# Patient Record
Sex: FEMALE | Race: WHITE | NOT HISPANIC OR LATINO | Employment: FULL TIME | URBAN - METROPOLITAN AREA
[De-identification: names, ages, dates, MRNs, and addresses within clinical notes are randomized per-mention and may not be internally consistent; named-entity substitution may affect disease eponyms.]

---

## 2018-05-12 PROBLEM — J01.10 ACUTE FRONTAL SINUSITIS: Status: ACTIVE | Noted: 2018-05-12

## 2018-05-12 PROBLEM — B34.9 PHARYNGITIS WITH VIRAL SYNDROME: Status: ACTIVE | Noted: 2018-05-12

## 2018-05-12 PROBLEM — J02.9 PHARYNGITIS WITH VIRAL SYNDROME: Status: ACTIVE | Noted: 2018-05-12

## 2019-05-10 DIAGNOSIS — N64.89 BREAST ASYMMETRY: Primary | ICD-10-CM

## 2019-05-24 ENCOUNTER — APPOINTMENT (OUTPATIENT)
Dept: WOMENS IMAGING | Facility: HOSPITAL | Age: 64
End: 2019-05-24

## 2019-05-24 PROCEDURE — 77065 DX MAMMO INCL CAD UNI: CPT | Performed by: RADIOLOGY

## 2019-05-31 DIAGNOSIS — N64.89 BREAST ASYMMETRY: ICD-10-CM

## 2023-10-16 NOTE — PROGRESS NOTES
"Chief Complaint  Establish Care    Subjective        HPI   Joan presents to [facility] for a new pt visit. Hasn't seen a PCP recently. Retired RN.     Gyn Care: Was seeing Dr. Cleve Jean, hasn't seen in years. Had a hysterectomy for endometriosis in her 30s. No abnormal pap smears. Took hormones for a little while    Has osteoporosis: Broke a rib doing yard work, found out she had osteoporosis. Previously was on medication, quit taking when bone density reportedly returned to normal    Effexor: Has panic attacks and PTSD, several traumas in hre life. Anxiety worse after hysterectomy. Sees Dr. Velazquez for psychiatric care.    Colon cancer screening: Needs. No symptoms such as unintentional wt loss, change in stool habits, blood in stool    FH: Father had pancreatic cancer. Mother had lung cancer (smoker). Also breast cancer in the family.     Tired all the time. Sleeps well but doesn't awaken refreshed. Ex- used say she snores like thier bulldog.     Has occ vertigo, chronic tinnitus and hearing loss in R ear. Saw two ENTs and was told she had a bit of hearing loss on the right. Was told to avoid caffeine, stressors.      Objective   Vital Signs:   Vitals:    10/23/23 1030   BP: 124/88   BP Location: Left arm   Patient Position: Sitting   Cuff Size: Adult   Pulse: 67   SpO2: 96%   Weight: 60.5 kg (133 lb 4.8 oz)   Height: 165.1 cm (65\")                     Physical Exam  Constitutional:       General: She is not in acute distress.     Appearance: Normal appearance. She is not toxic-appearing.   HENT:      Head: Normocephalic and atraumatic.      Right Ear: Tympanic membrane normal.      Left Ear: Tympanic membrane normal.      Mouth/Throat:      Mouth: Mucous membranes are moist.   Eyes:      General: No scleral icterus.     Conjunctiva/sclera: Conjunctivae normal.   Cardiovascular:      Rate and Rhythm: Normal rate and regular rhythm.      Heart sounds: Normal heart sounds. " No murmur heard.     No friction rub. No gallop.   Pulmonary:      Effort: Pulmonary effort is normal. No respiratory distress.      Breath sounds: Normal breath sounds.   Abdominal:      General: Bowel sounds are normal. There is no distension.      Palpations: Abdomen is soft.      Tenderness: There is no abdominal tenderness.   Musculoskeletal:         General: No swelling, tenderness or deformity. Normal range of motion.      Cervical back: Normal range of motion and neck supple.      Right lower leg: No edema.      Left lower leg: No edema.   Skin:     General: Skin is warm and dry.      Findings: No lesion or rash.   Neurological:      General: No focal deficit present.      Mental Status: She is alert and oriented to person, place, and time.   Psychiatric:         Mood and Affect: Mood normal.         Behavior: Behavior normal.         Judgment: Judgment normal.          Result Review :     The following data was reviewed by: Adry Aggarwal MD on 10/23/2023:  UC with Zane Beavers MD (08/28/2020)   UC with Gerardo Marrero MD (12/15/2022)            Assessment and Plan    Diagnoses and all orders for this visit:    1. Osteoporosis, unspecified osteoporosis type, unspecified pathological fracture presence (Primary)  Assessment & Plan:  Hasn't had a DEXA in a long while. Previously was on medication for this (suspect Fosamax), told bones returned to normal. Check DEXA, TSH, Vit D, CMP.    Orders:  -     TSH Rfx On Abnormal To Free T4  -     Vitamin D,25-Hydroxy    2. Routine health maintenance  Assessment & Plan:  Discussed colonoscopy patient asymptomatic, no history of polyps.  Patient prefers to proceed with Cologuard, with the understanding she would need a colonoscopy if it is positive. Ordered    Mammogram ordered  DEXA ordered  Discussed recommended vaccines, patient would like to think about getting them  Labs today  No need for cervical CA screening, s/p hyst in her 30s for endometriosis, no  abnormal paps    Orders:  -     CBC (No Diff)  -     Comprehensive Metabolic Panel  -     Hepatitis C Antibody  -     Lipid Panel  -     TSH Rfx On Abnormal To Free T4    3. Screening for lipid disorders  -     Lipid Panel    4. Encounter for hepatitis C screening test for low risk patient  -     Hepatitis C Antibody    5. Screening for colon cancer  -     Cologuard - Stool, Per Rectum; Future    6. Other osteoporosis without current pathological fracture  Assessment & Plan:  Hasn't had a DEXA in a long while. Previously was on medication for this (suspect Fosamax), told bones returned to normal. Check DEXA, TSH, Vit D, CMP.    Orders:  -     TSH Rfx On Abnormal To Free T4  -     DEXA Bone Density Axial    7. Encounter for screening mammogram for malignant neoplasm of breast  -     Mammo screening digital tomosynthesis bilateral w CAD; Future    8. Other fatigue  Assessment & Plan:  CBC, CMP, TSH today  Awakens un-refreshed, apparently snores, could consider WEST work up if labs unrevealing      9. Tinnitus of right ear  Assessment & Plan:  Also reportedly with mild R hearing loss and intermittent vertigo. Has seen ENT. Told to avoid triggers. Never has been diagnosed with Meniere's disease but will keep in ddx.       10. PTSD (post-traumatic stress disorder)  Assessment & Plan:  Sees a psychiatrist, Dr. Velazquez, who prescribes Effexor.       11. Anxiety      Follow Up   Return in about 1 year (around 10/23/2024) for Medicare Wellness and regular appt-30 minutes.  Patient was given instructions and counseling regarding her condition or for health maintenance advice. Please see specific information pulled into the AVS if appropriate.

## 2023-10-23 ENCOUNTER — OFFICE VISIT (OUTPATIENT)
Dept: FAMILY MEDICINE CLINIC | Facility: CLINIC | Age: 68
End: 2023-10-23
Payer: MEDICARE

## 2023-10-23 VITALS
WEIGHT: 133.3 LBS | BODY MASS INDEX: 22.21 KG/M2 | DIASTOLIC BLOOD PRESSURE: 88 MMHG | HEIGHT: 65 IN | HEART RATE: 67 BPM | OXYGEN SATURATION: 96 % | SYSTOLIC BLOOD PRESSURE: 124 MMHG

## 2023-10-23 DIAGNOSIS — M81.8 OTHER OSTEOPOROSIS WITHOUT CURRENT PATHOLOGICAL FRACTURE: ICD-10-CM

## 2023-10-23 DIAGNOSIS — M81.0 OSTEOPOROSIS, UNSPECIFIED OSTEOPOROSIS TYPE, UNSPECIFIED PATHOLOGICAL FRACTURE PRESENCE: Primary | ICD-10-CM

## 2023-10-23 DIAGNOSIS — Z00.00 ROUTINE HEALTH MAINTENANCE: ICD-10-CM

## 2023-10-23 DIAGNOSIS — Z12.11 SCREENING FOR COLON CANCER: ICD-10-CM

## 2023-10-23 DIAGNOSIS — F41.9 ANXIETY: ICD-10-CM

## 2023-10-23 DIAGNOSIS — F43.10 PTSD (POST-TRAUMATIC STRESS DISORDER): ICD-10-CM

## 2023-10-23 DIAGNOSIS — Z12.31 ENCOUNTER FOR SCREENING MAMMOGRAM FOR MALIGNANT NEOPLASM OF BREAST: ICD-10-CM

## 2023-10-23 DIAGNOSIS — H93.11 TINNITUS OF RIGHT EAR: ICD-10-CM

## 2023-10-23 DIAGNOSIS — Z13.220 SCREENING FOR LIPID DISORDERS: ICD-10-CM

## 2023-10-23 DIAGNOSIS — R53.83 OTHER FATIGUE: ICD-10-CM

## 2023-10-23 DIAGNOSIS — Z11.59 ENCOUNTER FOR HEPATITIS C SCREENING TEST FOR LOW RISK PATIENT: ICD-10-CM

## 2023-10-23 PROBLEM — B34.9 PHARYNGITIS WITH VIRAL SYNDROME: Status: RESOLVED | Noted: 2018-05-12 | Resolved: 2023-10-23

## 2023-10-23 PROBLEM — J01.10 ACUTE FRONTAL SINUSITIS: Status: RESOLVED | Noted: 2018-05-12 | Resolved: 2023-10-23

## 2023-10-23 PROBLEM — J02.9 PHARYNGITIS WITH VIRAL SYNDROME: Status: RESOLVED | Noted: 2018-05-12 | Resolved: 2023-10-23

## 2023-10-23 NOTE — ASSESSMENT & PLAN NOTE
CBC, CMP, TSH today  Awakens un-refreshed, apparently snores, could consider WEST work up if labs unrevealing

## 2023-10-23 NOTE — PATIENT INSTRUCTIONS
Vaccines to consider: [id number], Shingrix, tetanus, influenza, COVID-19  We will give you a call about mammogram and DEXA scan  Cologuard will be mailed to you

## 2023-10-23 NOTE — ASSESSMENT & PLAN NOTE
Discussed colonoscopy patient asymptomatic, no history of polyps.  Patient prefers to proceed with Cologuard, with the understanding she would need a colonoscopy if it is positive. Ordered    Mammogram ordered  DEXA ordered  Discussed recommended vaccines, patient would like to think about getting them  Labs today  No need for cervical CA screening, s/p hyst in her 30s for endometriosis, no abnormal paps

## 2023-10-23 NOTE — ASSESSMENT & PLAN NOTE
Also reportedly with mild R hearing loss and intermittent vertigo. Has seen ENT. Told to avoid triggers. Never has been diagnosed with Meniere's disease but will keep in ddx.

## 2023-10-23 NOTE — ASSESSMENT & PLAN NOTE
Hasn't had a DEXA in a long while. Previously was on medication for this (suspect Fosamax), told bones returned to normal. Check DEXA, TSH, Vit D, CMP.

## 2023-10-24 LAB
25(OH)D3+25(OH)D2 SERPL-MCNC: 22.2 NG/ML (ref 30–100)
ALBUMIN SERPL-MCNC: 4.5 G/DL (ref 3.5–5.2)
ALBUMIN/GLOB SERPL: 2.1 G/DL
ALP SERPL-CCNC: 100 U/L (ref 39–117)
ALT SERPL-CCNC: 21 U/L (ref 1–33)
AST SERPL-CCNC: 21 U/L (ref 1–32)
BILIRUB SERPL-MCNC: 0.3 MG/DL (ref 0–1.2)
BUN SERPL-MCNC: 10 MG/DL (ref 8–23)
BUN/CREAT SERPL: 13.2 (ref 7–25)
CALCIUM SERPL-MCNC: 9.3 MG/DL (ref 8.6–10.5)
CHLORIDE SERPL-SCNC: 104 MMOL/L (ref 98–107)
CHOLEST SERPL-MCNC: 275 MG/DL (ref 0–200)
CO2 SERPL-SCNC: 27.2 MMOL/L (ref 22–29)
CREAT SERPL-MCNC: 0.76 MG/DL (ref 0.57–1)
EGFRCR SERPLBLD CKD-EPI 2021: 86.5 ML/MIN/1.73
ERYTHROCYTE [DISTWIDTH] IN BLOOD BY AUTOMATED COUNT: 12.8 % (ref 12.3–15.4)
GLOBULIN SER CALC-MCNC: 2.1 GM/DL
GLUCOSE SERPL-MCNC: 108 MG/DL (ref 65–99)
HCT VFR BLD AUTO: 46.4 % (ref 34–46.6)
HCV IGG SERPL QL IA: NON REACTIVE
HDLC SERPL-MCNC: 68 MG/DL (ref 40–60)
HGB BLD-MCNC: 15.6 G/DL (ref 12–15.9)
LDLC SERPL CALC-MCNC: 177 MG/DL (ref 0–100)
MCH RBC QN AUTO: 31.1 PG (ref 26.6–33)
MCHC RBC AUTO-ENTMCNC: 33.6 G/DL (ref 31.5–35.7)
MCV RBC AUTO: 92.6 FL (ref 79–97)
PLATELET # BLD AUTO: 243 10*3/MM3 (ref 140–450)
POTASSIUM SERPL-SCNC: 4.2 MMOL/L (ref 3.5–5.2)
PROT SERPL-MCNC: 6.6 G/DL (ref 6–8.5)
RBC # BLD AUTO: 5.01 10*6/MM3 (ref 3.77–5.28)
SODIUM SERPL-SCNC: 140 MMOL/L (ref 136–145)
TRIGL SERPL-MCNC: 164 MG/DL (ref 0–150)
TSH SERPL DL<=0.005 MIU/L-ACNC: 2.21 UIU/ML (ref 0.27–4.2)
VLDLC SERPL CALC-MCNC: 30 MG/DL (ref 5–40)
WBC # BLD AUTO: 7.09 10*3/MM3 (ref 3.4–10.8)

## 2023-10-24 RX ORDER — ERGOCALCIFEROL 1.25 MG/1
50000 CAPSULE ORAL WEEKLY
Qty: 12 CAPSULE | Refills: 0 | Status: SHIPPED | OUTPATIENT
Start: 2023-10-24

## 2023-10-25 ENCOUNTER — PATIENT ROUNDING (BHMG ONLY) (OUTPATIENT)
Dept: FAMILY MEDICINE CLINIC | Facility: CLINIC | Age: 68
End: 2023-10-25
Payer: MEDICARE

## 2023-10-25 NOTE — PROGRESS NOTES
A My-Chart message has been sent to the patient for PATIENT ROUNDING with [facility]

## 2023-11-06 ENCOUNTER — APPOINTMENT (OUTPATIENT)
Dept: OTHER | Facility: HOSPITAL | Age: 68
End: 2023-11-06
Payer: MEDICARE

## 2023-11-06 ENCOUNTER — HOSPITAL ENCOUNTER (OUTPATIENT)
Dept: MAMMOGRAPHY | Facility: HOSPITAL | Age: 68
Discharge: HOME | End: 2023-11-06
Payer: MEDICARE

## 2023-11-06 ENCOUNTER — HOSPITAL ENCOUNTER (OUTPATIENT)
Dept: BONE DENSITY | Facility: HOSPITAL | Age: 68
Discharge: HOME | End: 2023-11-06
Admitting: INTERNAL MEDICINE
Payer: MEDICARE

## 2023-11-06 DIAGNOSIS — Z12.31 ENCOUNTER FOR SCREENING MAMMOGRAM FOR MALIGNANT NEOPLASM OF BREAST: ICD-10-CM

## 2023-11-06 DIAGNOSIS — R92.8 ABNORMAL MAMMOGRAM: Primary | ICD-10-CM

## 2023-11-06 PROCEDURE — 77063 BREAST TOMOSYNTHESIS BI: CPT

## 2023-11-06 PROCEDURE — 77067 SCR MAMMO BI INCL CAD: CPT

## 2023-11-06 PROCEDURE — 77080 DXA BONE DENSITY AXIAL: CPT

## 2023-11-14 ENCOUNTER — OFFICE VISIT (OUTPATIENT)
Dept: FAMILY MEDICINE CLINIC | Facility: CLINIC | Age: 68
End: 2023-11-14
Payer: MEDICARE

## 2023-11-14 VITALS
OXYGEN SATURATION: 98 % | SYSTOLIC BLOOD PRESSURE: 112 MMHG | HEIGHT: 65 IN | WEIGHT: 133 LBS | HEART RATE: 81 BPM | BODY MASS INDEX: 22.16 KG/M2 | DIASTOLIC BLOOD PRESSURE: 76 MMHG

## 2023-11-14 DIAGNOSIS — R73.03 PREDIABETES: ICD-10-CM

## 2023-11-14 DIAGNOSIS — E78.5 HYPERLIPIDEMIA, UNSPECIFIED HYPERLIPIDEMIA TYPE: ICD-10-CM

## 2023-11-14 DIAGNOSIS — E55.9 VITAMIN D INSUFFICIENCY: ICD-10-CM

## 2023-11-14 DIAGNOSIS — M81.6 LOCALIZED OSTEOPOROSIS WITHOUT CURRENT PATHOLOGICAL FRACTURE: Primary | ICD-10-CM

## 2023-11-14 PROBLEM — R92.8 ABNORMAL MAMMOGRAM: Status: ACTIVE | Noted: 2023-11-14

## 2023-11-14 PROCEDURE — 1159F MED LIST DOCD IN RCRD: CPT | Performed by: INTERNAL MEDICINE

## 2023-11-14 PROCEDURE — 1160F RVW MEDS BY RX/DR IN RCRD: CPT | Performed by: INTERNAL MEDICINE

## 2023-11-14 PROCEDURE — 99214 OFFICE O/P EST MOD 30 MIN: CPT | Performed by: INTERNAL MEDICINE

## 2023-11-14 RX ORDER — ALENDRONATE SODIUM 70 MG/1
70 TABLET ORAL
Qty: 12 TABLET | Refills: 3 | Status: SHIPPED | OUTPATIENT
Start: 2023-11-14

## 2023-11-14 NOTE — ASSESSMENT & PLAN NOTE
ASDVD risk 5.7%  Pt working on making positive dietary changes and exercise  Declines nutritionist referral  Re-check in 2-3 mos

## 2023-11-14 NOTE — PROGRESS NOTES
"Chief Complaint  Follow-up and Osteoporosis    Subjective        HPI   Joan presents to [facility] for a follow up visit.    Answers submitted by the patient for this visit:  Other (Submitted on 11/13/2023)  Please describe your symptoms.: F/U visit to discuss medications for osteoporosis  Have you had these symptoms before?: No  How long have you been having these symptoms?: Greater than 2 weeks  Please list any medications you are currently taking for this condition.: Effexor, Vitamin D  Primary Reason for Visit (Submitted on 11/13/2023)  What is the primary reason for your visit?: Other    On labs, Vit D low, cholesterol high, FBS high.    Mammogram abnormal, diagnostic imaging ordered. Has dx imaging scheduled.     DEXA with osteoporosis of both hips and spine. Hx hyst in 30s for endometriosis.    Working on dietary choices and exercises with the HLD and pre-DM.        Objective   Vital Signs:   Vitals:    11/14/23 1504   BP: 112/76   BP Location: Left arm   Patient Position: Sitting   Cuff Size: Adult   Pulse: 81   SpO2: 98%   Weight: 60.3 kg (133 lb)   Height: 165.1 cm (65\")            10/23/2023    10:31 AM   PHQ-2/PHQ-9 Depression Screening   Little Interest or Pleasure in Doing Things 0-->not at all   Feeling Down, Depressed or Hopeless 0-->not at all   PHQ-9: Brief Depression Severity Measure Score 0       BMI is within normal parameters. No other follow-up for BMI required.        Physical Exam  Constitutional:       General: She is not in acute distress.     Appearance: Normal appearance.   HENT:      Head: Normocephalic and atraumatic.   Eyes:      Conjunctiva/sclera: Conjunctivae normal.   Cardiovascular:      Rate and Rhythm: Normal rate.   Pulmonary:      Effort: Pulmonary effort is normal.   Musculoskeletal:         General: No swelling or deformity.   Skin:     Coloration: Skin is not jaundiced.      Findings: No rash.   Neurological:      General: No focal deficit " present.      Mental Status: She is alert and oriented to person, place, and time.   Psychiatric:         Mood and Affect: Mood normal.         Behavior: Behavior normal.         Judgment: Judgment normal.          Result Review :                Assessment and Plan    Diagnoses and all orders for this visit:    1. Localized osteoporosis without current pathological fracture (Primary)  Assessment & Plan:  DEXA Bone Density Axial (11/06/2023 07:33)   T scores -3.2  Recommended Fosamax  Discussed rare risk of atypical femur fractures and ONJ. These risks are MUCH less than risk of osteoporotic fx, especially since pt has had an osteoporotic fx. No major dental work coming up. Discussed proper admin. Discussed adding WB exercising, already eating Ca++ rich foods, repleting Vit D      2. Vitamin D insufficiency  Assessment & Plan:  Repleting, check Vit D level w next set of labs      3. Hyperlipidemia, unspecified hyperlipidemia type  Assessment & Plan:  ASDVD risk 5.7%  Pt working on making positive dietary changes and exercise  Declines nutritionist referral  Re-check in 2-3 mos      4. Prediabetes  Assessment & Plan:  See HLD section (same plan)      Other orders  -     alendronate (Fosamax) 70 MG tablet; Take 1 tablet by mouth Every 7 (Seven) Days. Take with full glass of water, 30 minutes before first food or drink of the day (other than water), stay upright x 30 minutes  Dispense: 12 tablet; Refill: 3        Follow Up   Return in about 2 months (around 1/14/2024) for Recheck, Next scheduled follow up, Medicare Wellness and regular appt-30 minutes.  Patient was given instructions and counseling regarding her condition or for health maintenance advice. Please see specific information pulled into the AVS if appropriate.

## 2023-11-14 NOTE — ASSESSMENT & PLAN NOTE
DEXA Bone Density Axial (11/06/2023 07:33)   T scores -3.2  Recommended Fosamax  Discussed rare risk of atypical femur fractures and ONJ. These risks are MUCH less than risk of osteoporotic fx, especially since pt has had an osteoporotic fx. No major dental work coming up. Discussed proper admin. Discussed adding WB exercising, already eating Ca++ rich foods, repleting Vit D

## 2023-11-29 ENCOUNTER — HOSPITAL ENCOUNTER (OUTPATIENT)
Dept: ULTRASOUND IMAGING | Facility: HOSPITAL | Age: 68
Discharge: HOME | End: 2023-11-29
Admitting: INTERNAL MEDICINE
Payer: MEDICARE

## 2023-11-29 ENCOUNTER — HOSPITAL ENCOUNTER (OUTPATIENT)
Dept: MAMMOGRAPHY | Facility: HOSPITAL | Age: 68
Discharge: HOME | End: 2023-11-29
Payer: MEDICARE

## 2023-11-29 DIAGNOSIS — R92.8 ABNORMAL MAMMOGRAM: ICD-10-CM

## 2023-11-29 PROCEDURE — 76642 ULTRASOUND BREAST LIMITED: CPT

## 2023-11-29 PROCEDURE — 77065 DX MAMMO INCL CAD UNI: CPT

## 2023-11-29 PROCEDURE — G0279 TOMOSYNTHESIS, MAMMO: HCPCS

## 2023-12-18 ENCOUNTER — OFFICE VISIT (OUTPATIENT)
Dept: FAMILY MEDICINE CLINIC | Facility: CLINIC | Age: 68
End: 2023-12-18
Payer: MEDICARE

## 2023-12-18 ENCOUNTER — HOSPITAL ENCOUNTER (OUTPATIENT)
Dept: CT IMAGING | Facility: HOSPITAL | Age: 68
Discharge: HOME | End: 2023-12-18
Admitting: INTERNAL MEDICINE
Payer: MEDICARE

## 2023-12-18 VITALS
DIASTOLIC BLOOD PRESSURE: 82 MMHG | OXYGEN SATURATION: 97 % | HEART RATE: 87 BPM | HEIGHT: 65 IN | BODY MASS INDEX: 22.16 KG/M2 | SYSTOLIC BLOOD PRESSURE: 128 MMHG | WEIGHT: 133 LBS

## 2023-12-18 DIAGNOSIS — R55 SYNCOPE, UNSPECIFIED SYNCOPE TYPE: Primary | ICD-10-CM

## 2023-12-18 DIAGNOSIS — R06.09 DYSPNEA ON EXERTION: ICD-10-CM

## 2023-12-18 DIAGNOSIS — R06.2 WHEEZING: ICD-10-CM

## 2023-12-18 DIAGNOSIS — R05.1 ACUTE COUGH: ICD-10-CM

## 2023-12-18 DIAGNOSIS — M81.6 LOCALIZED OSTEOPOROSIS WITHOUT CURRENT PATHOLOGICAL FRACTURE: ICD-10-CM

## 2023-12-18 PROCEDURE — 82565 ASSAY OF CREATININE: CPT

## 2023-12-18 PROCEDURE — 25510000001 IOPAMIDOL PER 1 ML: Performed by: INTERNAL MEDICINE

## 2023-12-18 PROCEDURE — 71275 CT ANGIOGRAPHY CHEST: CPT

## 2023-12-18 RX ORDER — PREDNISONE 20 MG/1
40 TABLET ORAL DAILY
Qty: 10 TABLET | Refills: 0 | Status: SHIPPED | OUTPATIENT
Start: 2023-12-18 | End: 2023-12-23

## 2023-12-18 RX ORDER — ALBUTEROL SULFATE 90 UG/1
2 AEROSOL, METERED RESPIRATORY (INHALATION) EVERY 4 HOURS PRN
Qty: 6.7 G | Refills: 0 | Status: SHIPPED | OUTPATIENT
Start: 2023-12-18

## 2023-12-18 RX ORDER — BENZONATATE 100 MG/1
100 CAPSULE ORAL 3 TIMES DAILY PRN
Qty: 21 CAPSULE | Refills: 0 | Status: SHIPPED | OUTPATIENT
Start: 2023-12-18

## 2023-12-18 RX ADMIN — IOPAMIDOL 95 ML: 755 INJECTION, SOLUTION INTRAVENOUS at 15:23

## 2023-12-18 NOTE — NURSING NOTE
Reported to Dr. Aggarwal CT report. Patient may go home but she will call patient to discuss results.     1618 Instructed pt she may go home and md will call her.     1622 Patient ambulated independently to entrance A.

## 2023-12-18 NOTE — PROGRESS NOTES
"Chief Complaint  Medication Reaction (Started fosamax and started coughing ans wheezing. Saturday went to a [employer] party and got really hot and passed out. )    Subjective        HPI   Joan presents to [facility] for an acute visit. She had a syncopal episode this weekend. Concerned about side effects from Fosamax.     This weekend, she was at a [employer] party, drinking wine, started feeling hot, started sweating, went to bedroom, found on floor. Thought it was just because she got hot. Subsequently recovered. Did not go to ED. Drinks wine most nights so this was not unusual.  She has been coughing and perhaps wheezing a bit, having frothy white and yellow sputum, intermittently short of breath for the last week or so. Can't lie flat, exacerbates sxs. No fevers, chill. Coughing so much her chest is sore. Does not smoke, no known pulmonary disease.     Heartburn and constipation, cough since starting Fosamax. Does not feel that she can take it due to side effects.      Objective   Vital Signs:  Vitals:    12/18/23 1244   BP: 128/82   BP Location: Left arm   Patient Position: Sitting   Cuff Size: Adult   Pulse: 87   SpO2: 97%   Weight: 60.3 kg (133 lb)   Height: 165.1 cm (65\")          Physical Exam  Constitutional:       General: She is not in acute distress.     Appearance: Normal appearance. She is not toxic-appearing.   HENT:      Head: Normocephalic and atraumatic.      Mouth/Throat:      Mouth: Mucous membranes are moist.   Eyes:      General: No scleral icterus.     Conjunctiva/sclera: Conjunctivae normal.   Cardiovascular:      Rate and Rhythm: Normal rate and regular rhythm.      Heart sounds: Normal heart sounds. No murmur heard.     No friction rub. No gallop.   Pulmonary:      Effort: Pulmonary effort is normal. No respiratory distress.      Breath sounds: Normal breath sounds.      Comments: Faint end exp wheezing apices  Musculoskeletal:         General: No swelling, " tenderness or deformity. Normal range of motion.      Cervical back: Normal range of motion and neck supple.      Right lower leg: No edema.      Left lower leg: No edema.   Skin:     General: Skin is warm and dry.      Coloration: Skin is not jaundiced.      Findings: No lesion or rash.   Neurological:      General: No focal deficit present.      Mental Status: She is alert and oriented to person, place, and time.   Psychiatric:         Mood and Affect: Mood normal.         Behavior: Behavior normal.         Judgment: Judgment normal.          Result Review :                Assessment and Plan    Diagnoses and all orders for this visit:    1. Syncope, unspecified syncope type (Primary)  -     Cancel: CBC (No Diff)  -     Cancel: Comprehensive Metabolic Panel  -     Cancel: proBNP  -     ECG 12 Lead  -     CBC (No Diff)  -     Comprehensive Metabolic Panel  -     proBNP  -     CT Angiogram Chest  -     Adult Transthoracic Echo Complete W/ Cont if Necessary Per Protocol; Future  -     Ambulatory Referral to Cardiology    2. Localized osteoporosis without current pathological fracture    3. Wheezing  -     Cancel: CBC (No Diff)  -     CBC (No Diff)  -     POCT SARS-CoV-2 Antigen DEXTER + Flu  -     predniSONE (DELTASONE) 20 MG tablet; Take 2 tablets by mouth Daily for 5 days.  Dispense: 10 tablet; Refill: 0  -     albuterol sulfate  (90 Base) MCG/ACT inhaler; Inhale 2 puffs Every 4 (Four) Hours As Needed for Wheezing or Shortness of Air.  Dispense: 6.7 g; Refill: 0  -     benzonatate (Tessalon Perles) 100 MG capsule; Take 1 capsule by mouth 3 (Three) Times a Day As Needed for Cough.  Dispense: 21 capsule; Refill: 0    4. Acute cough  -     Cancel: CBC (No Diff)  -     Cancel: Comprehensive Metabolic Panel  -     Cancel: proBNP  -     CBC (No Diff)  -     Comprehensive Metabolic Panel  -     proBNP  -     POCT SARS-CoV-2 Antigen DEXTER + Flu  -     CT Angiogram Chest  -     predniSONE (DELTASONE) 20 MG tablet; Take 2  tablets by mouth Daily for 5 days.  Dispense: 10 tablet; Refill: 0  -     albuterol sulfate  (90 Base) MCG/ACT inhaler; Inhale 2 puffs Every 4 (Four) Hours As Needed for Wheezing or Shortness of Air.  Dispense: 6.7 g; Refill: 0  -     benzonatate (Tessalon Perles) 100 MG capsule; Take 1 capsule by mouth 3 (Three) Times a Day As Needed for Cough.  Dispense: 21 capsule; Refill: 0    5. Dyspnea on exertion  -     Cancel: proBNP  -     ECG 12 Lead  -     proBNP  -     CT Angiogram Chest    EKG personally interpreted by me, no prior comparison. Shows NSR, no WV or Qtc prolongation, LAD, very minimal ST depression V2/V3.Due to syncopal episode this weekend, report of cough with dyspnea intermittently, important to eval for PE. CT PE protocol ordered, results called to me, pt not available at time I was called with results. I called pt and LVM, sent her a [email or web address] message. No PE, no obvious consolidation, just mucus plugging. COVID and flu negative.     Plan:   CBC, CMP, BNP today  Steroids and albuterol inhaler due to wheezing, can also take mucinex.   If no improvement in sxs, consider abx but due to illness duration and lack of consolidation on today's imaging, can hold off for now.   Echocardiogram  Cardiology consult. Syncope likely vasovagal but unusual circumstances of syncope.   Follow up in about a month to discuss alternate osteoporosis therapies, appears that she is unable to tolerate Fosamax due to GI side effects. She self-discontinued.     I spent 45 minutes caring for Joan on this date of service. This time includes time spent by me in the following activities:preparing for the visit, reviewing tests, obtaining and/or reviewing a separately obtained history, performing a medically appropriate examination and/or evaluation , counseling and educating the patient/family/caregiver, ordering medications, tests, or procedures, documenting information in the medical record, independently interpreting results  and communicating that information with the patient/family/caregiver, and care coordination    Follow Up   Return in about 4 weeks (around 1/15/2024), or if symptoms worsen or fail to improve, for Recheck, Next scheduled follow up.  Patient was given instructions and counseling regarding her condition or for health maintenance advice. Please see specific information pulled into the AVS if appropriate.

## 2023-12-19 LAB
ALBUMIN SERPL-MCNC: 4.2 G/DL (ref 3.5–5.2)
ALBUMIN/GLOB SERPL: 2.1 G/DL
ALP SERPL-CCNC: 92 U/L (ref 39–117)
ALT SERPL-CCNC: 19 U/L (ref 1–33)
AST SERPL-CCNC: 17 U/L (ref 1–32)
BILIRUB SERPL-MCNC: <0.2 MG/DL (ref 0–1.2)
BUN SERPL-MCNC: 9 MG/DL (ref 8–23)
BUN/CREAT SERPL: 12.3 (ref 7–25)
CALCIUM SERPL-MCNC: 8.9 MG/DL (ref 8.6–10.5)
CHLORIDE SERPL-SCNC: 103 MMOL/L (ref 98–107)
CO2 SERPL-SCNC: 25.2 MMOL/L (ref 22–29)
CREAT BLDA-MCNC: 0.6 MG/DL (ref 0.6–1.3)
CREAT SERPL-MCNC: 0.73 MG/DL (ref 0.57–1)
EGFRCR SERPLBLD CKD-EPI 2021: 90.3 ML/MIN/1.73
ERYTHROCYTE [DISTWIDTH] IN BLOOD BY AUTOMATED COUNT: 12.6 % (ref 12.3–15.4)
EXPIRATION DATE: NORMAL
FLUAV AG UPPER RESP QL IA.RAPID: NOT DETECTED
FLUBV AG UPPER RESP QL IA.RAPID: NOT DETECTED
GLOBULIN SER CALC-MCNC: 2 GM/DL
GLUCOSE SERPL-MCNC: 97 MG/DL (ref 65–99)
HCT VFR BLD AUTO: 41.5 % (ref 34–46.6)
HGB BLD-MCNC: 14.3 G/DL (ref 12–15.9)
INTERNAL CONTROL: NORMAL
Lab: NORMAL
MCH RBC QN AUTO: 31.6 PG (ref 26.6–33)
MCHC RBC AUTO-ENTMCNC: 34.5 G/DL (ref 31.5–35.7)
MCV RBC AUTO: 91.6 FL (ref 79–97)
NT-PROBNP SERPL-MCNC: 62 PG/ML (ref 0–301)
PLATELET # BLD AUTO: 242 10*3/MM3 (ref 140–450)
POTASSIUM SERPL-SCNC: 4.1 MMOL/L (ref 3.5–5.2)
PROT SERPL-MCNC: 6.2 G/DL (ref 6–8.5)
RBC # BLD AUTO: 4.53 10*6/MM3 (ref 3.77–5.28)
SARS-COV-2 AG UPPER RESP QL IA.RAPID: NOT DETECTED
SODIUM SERPL-SCNC: 139 MMOL/L (ref 136–145)
WBC # BLD AUTO: 6.96 10*3/MM3 (ref 3.4–10.8)

## 2023-12-21 ENCOUNTER — OFFICE VISIT (OUTPATIENT)
Dept: CARDIOLOGY | Facility: CLINIC | Age: 68
End: 2023-12-21
Payer: MEDICARE

## 2023-12-21 VITALS
BODY MASS INDEX: 22.16 KG/M2 | RESPIRATION RATE: 16 BRPM | WEIGHT: 133 LBS | SYSTOLIC BLOOD PRESSURE: 120 MMHG | OXYGEN SATURATION: 99 % | HEART RATE: 63 BPM | HEIGHT: 65 IN | DIASTOLIC BLOOD PRESSURE: 76 MMHG

## 2023-12-21 DIAGNOSIS — R55 SYNCOPE AND COLLAPSE: Primary | ICD-10-CM

## 2023-12-21 DIAGNOSIS — R06.01 ORTHOPNEA: ICD-10-CM

## 2023-12-21 PROCEDURE — 93000 ELECTROCARDIOGRAM COMPLETE: CPT | Performed by: INTERNAL MEDICINE

## 2023-12-21 PROCEDURE — 99204 OFFICE O/P NEW MOD 45 MIN: CPT | Performed by: INTERNAL MEDICINE

## 2023-12-21 NOTE — PROGRESS NOTES
"      CARDIOLOGY    Radha Dickinson MD    ENCOUNTER DATE:  12/21/2023    Joan Gonzales / 68 y.o. / female        CHIEF COMPLAINT / REASON FOR OFFICE VISIT     Heart Problem (Syncope/)      HISTORY OF PRESENT ILLNESS       HPI    Joan Gonzales is a 68 y.o. 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 She was feeling well until she started taking Fosamax a few weeks ago and she developed some shortness of breath and coughing with some clear frothy sputum coming up. She had some wheezing and orthopnea. Then she was at a [employer] party less than a week ago and felt really hot and went to go get her purse and had a syncopal episode where she was only unconscious for a few seconds. She had a CT scan which was negative for PE but did show some mucus plugging. I reviewed those images myself and did not see any coronary artery calcification, though I am a little concerned that she may have some left ventricular hypertrophy.         REVIEW OF SYSTEMS     Review of Systems   Constitutional: Negative for chills, fever, weight gain and weight loss.   Cardiovascular:  Negative for leg swelling.   Respiratory:  Positive for cough and wheezing. Negative for snoring.    Hematologic/Lymphatic: Negative for bleeding problem. Does not bruise/bleed easily.   Skin:  Negative for color change.   Musculoskeletal:  Negative for falls, joint pain and myalgias.   Gastrointestinal:  Negative for melena.   Genitourinary:  Negative for hematuria.   Neurological:  Negative for excessive daytime sleepiness.   Psychiatric/Behavioral:  Negative for depression. The patient is nervous/anxious.          VITAL SIGNS     Visit Vitals  /76 (BP Location: Right arm, Patient Position: Sitting, Cuff Size: Adult)   Pulse 63   Resp 16   Ht 165.1 cm (65\")   Wt 60.3 kg (133 lb)   SpO2 99%   BMI 22.13 kg/m²         Wt Readings from Last 3 Encounters:   12/21/23 60.3 kg (133 lb)   12/18/23 60.3 kg (133 lb)   11/14/23 60.3 kg (133 lb)     Body " mass index is 22.13 kg/m².      PHYSICAL EXAMINATION     Constitutional:       General: Not in acute distress.  Neck:      Vascular: No carotid bruit or JVD.   Pulmonary:      Effort: Pulmonary effort is normal.      Breath sounds: Normal breath sounds.   Cardiovascular:      Normal rate. Regular rhythm.      Murmurs: There is no murmur.   Psychiatric:         Mood and Affect: Mood and affect normal.           REVIEWED DATA       ECG 12 Lead    Date/Time: 12/21/2023 11:26 AM  Performed by: Radha Dickinson MD    Authorized by: Radha Dickinson MD  Comparison: compared with previous ECG from 12/18/2023  Similar to previous ECG  Rhythm: sinus rhythm  BPM: 63  Conduction: conduction normal  ST Segments: ST segments normal  T Waves: T waves normal    Clinical impression: normal ECG            Lipid Panel          10/23/2023    11:21   Lipid Panel   Total Cholesterol 275    Triglycerides 164    HDL Cholesterol 68    VLDL Cholesterol 30    LDL Cholesterol  177        Lab Results   Component Value Date    GLUCOSE 97 12/18/2023    BUN 9 12/18/2023    CREATININE 0.60 12/18/2023    EGFRRESULT 90.3 12/18/2023    BCR 12.3 12/18/2023    K 4.1 12/18/2023    CO2 25.2 12/18/2023    CALCIUM 8.9 12/18/2023    PROTENTOTREF 6.2 12/18/2023    ALBUMIN 4.2 12/18/2023    BILITOT <0.2 12/18/2023    AST 17 12/18/2023    ALT 19 12/18/2023       ASSESSMENT & PLAN      Diagnosis Plan   1. Syncope and collapse  Adult Transthoracic Echo Complete W/ Cont if Necessary Per Protocol      2. Orthopnea            Syncope.  Probably, this is vagal considering that she was overheated.  Shortness of breath with coughing with clear, frothy sputum, wheezing and orthopnea.  Her BNP was normal and her chest x-ray did not show any fluid overload.  I would like to check an echocardiogram to assess her LV function and look for any left ventricular hypertrophy or anything that might be causing her symptoms.  Her EKG is normal.    Osteoporosis.  All of her  symptoms started with Fosamax.  I am not familiar with that causing these symptoms but in her case it does seem to be correlated so she is going to talk with Dr. Aggarwal about a different medication.     One of my nurses or I will go over the results of her echo when it becomes available.          Orders Placed This Encounter   Procedures    ECG 12 Lead     This order was created via procedure documentation     Order Specific Question:   Release to patient     Answer:   Routine Release [[id number]]    Adult Transthoracic Echo Complete W/ Cont if Necessary Per Protocol     Standing Status:   Future     Standing Expiration Date:   12/20/2024     Order Specific Question:   Reason for exam?     Answer:   Dyspnea     Order Specific Question:   Reason for exam?     Answer:   Syncope     Order Specific Question:   Release to patient     Answer:   Routine Release [[id number]]           MEDICATIONS         Discharge Medications            Accurate as of December 21, 2023 11:27 AM. If you have any questions, ask your nurse or doctor.                Changes to Medications        Instructions Start Date   albuterol sulfate  (90 Base) MCG/ACT inhaler  Commonly known as: PROVENTIL HFA;VENTOLIN HFA;PROAIR HFA  What changed: additional instructions   2 puffs, Inhalation, Every 4 Hours PRN             Continue These Medications        Instructions Start Date   benzonatate 100 MG capsule  Commonly known as: Tessalon Perles   100 mg, Oral, 3 Times Daily PRN      predniSONE 20 MG tablet  Commonly known as: DELTASONE   40 mg, Oral, Daily      venlafaxine 75 MG tablet  Commonly known as: EFFEXOR   75 mg, Oral, Daily      vitamin D 1.25 MG (83462 UT) capsule capsule  Commonly known as: ERGOCALCIFEROL   50,000 Units, Oral, Weekly                 Radha Dickinson MD  12/21/23  11:27 EST    Part of this note may be an electronic transcription/translation of spoken language to printed text using the Dragon dictation system.

## 2024-01-02 ENCOUNTER — HOSPITAL ENCOUNTER (OUTPATIENT)
Dept: CARDIOLOGY | Facility: HOSPITAL | Age: 69
Discharge: HOME | End: 2024-01-02
Admitting: INTERNAL MEDICINE
Payer: MEDICARE

## 2024-01-02 VITALS
SYSTOLIC BLOOD PRESSURE: 130 MMHG | HEART RATE: 72 BPM | BODY MASS INDEX: 22.16 KG/M2 | HEIGHT: 65 IN | WEIGHT: 133 LBS | DIASTOLIC BLOOD PRESSURE: 70 MMHG | OXYGEN SATURATION: 100 %

## 2024-01-02 DIAGNOSIS — R55 SYNCOPE AND COLLAPSE: ICD-10-CM

## 2024-01-02 LAB
AORTIC ARCH: 2.9 CM
AORTIC DIMENSIONLESS INDEX: 0.7 (DI)
ASCENDING AORTA: 3 CM
BH CV ECHO MEAS - ACS: 1.68 CM
BH CV ECHO MEAS - AO MAX PG: 7.7 MMHG
BH CV ECHO MEAS - AO MEAN PG: 4 MMHG
BH CV ECHO MEAS - AO ROOT DIAM: 3.3 CM
BH CV ECHO MEAS - AO V2 MAX: 139 CM/SEC
BH CV ECHO MEAS - AO V2 VTI: 32.8 CM
BH CV ECHO MEAS - AVA(I,D): 1.89 CM2
BH CV ECHO MEAS - EDV(CUBED): 67.8 ML
BH CV ECHO MEAS - EDV(MOD-SP2): 68 ML
BH CV ECHO MEAS - EDV(MOD-SP4): 95 ML
BH CV ECHO MEAS - EF(MOD-BP): 63.7 %
BH CV ECHO MEAS - EF(MOD-SP2): 61.8 %
BH CV ECHO MEAS - EF(MOD-SP4): 63.2 %
BH CV ECHO MEAS - ESV(CUBED): 17 ML
BH CV ECHO MEAS - ESV(MOD-SP2): 26 ML
BH CV ECHO MEAS - ESV(MOD-SP4): 35 ML
BH CV ECHO MEAS - FS: 36.9 %
BH CV ECHO MEAS - IVS/LVPW: 1.05 CM
BH CV ECHO MEAS - IVSD: 1.07 CM
BH CV ECHO MEAS - LAT PEAK E' VEL: 9.6 CM/SEC
BH CV ECHO MEAS - LV DIASTOLIC VOL/BSA (35-75): 57.1 CM2
BH CV ECHO MEAS - LV MASS(C)D: 138.9 GRAMS
BH CV ECHO MEAS - LV MAX PG: 3.8 MMHG
BH CV ECHO MEAS - LV MEAN PG: 1.8 MMHG
BH CV ECHO MEAS - LV SYSTOLIC VOL/BSA (12-30): 21 CM2
BH CV ECHO MEAS - LV V1 MAX: 97.4 CM/SEC
BH CV ECHO MEAS - LV V1 VTI: 21.1 CM
BH CV ECHO MEAS - LVIDD: 4.1 CM
BH CV ECHO MEAS - LVIDS: 2.6 CM
BH CV ECHO MEAS - LVOT AREA: 2.9 CM2
BH CV ECHO MEAS - LVOT DIAM: 1.93 CM
BH CV ECHO MEAS - LVPWD: 1.02 CM
BH CV ECHO MEAS - MED PEAK E' VEL: 6.5 CM/SEC
BH CV ECHO MEAS - MR MAX PG: 83.2 MMHG
BH CV ECHO MEAS - MR MAX VEL: 456.1 CM/SEC
BH CV ECHO MEAS - MV A DUR: 0.11 SEC
BH CV ECHO MEAS - MV A MAX VEL: 67.3 CM/SEC
BH CV ECHO MEAS - MV DEC SLOPE: 327.1 CM/SEC2
BH CV ECHO MEAS - MV DEC TIME: 0.17 SEC
BH CV ECHO MEAS - MV E MAX VEL: 67.7 CM/SEC
BH CV ECHO MEAS - MV E/A: 1.01
BH CV ECHO MEAS - MV MAX PG: 2.8 MMHG
BH CV ECHO MEAS - MV MEAN PG: 1.25 MMHG
BH CV ECHO MEAS - MV P1/2T: 71.7 MSEC
BH CV ECHO MEAS - MV V2 VTI: 28 CM
BH CV ECHO MEAS - MVA(P1/2T): 3.1 CM2
BH CV ECHO MEAS - MVA(VTI): 2.21 CM2
BH CV ECHO MEAS - PA ACC TIME: 0.13 SEC
BH CV ECHO MEAS - PA V2 MAX: 84.2 CM/SEC
BH CV ECHO MEAS - PI END-D VEL: 79.4 CM/SEC
BH CV ECHO MEAS - PULM A REVS DUR: 0.13 SEC
BH CV ECHO MEAS - PULM A REVS VEL: 30.4 CM/SEC
BH CV ECHO MEAS - PULM DIAS VEL: 45 CM/SEC
BH CV ECHO MEAS - PULM S/D: 1.36
BH CV ECHO MEAS - PULM SYS VEL: 61.3 CM/SEC
BH CV ECHO MEAS - QP/QS: 0.39
BH CV ECHO MEAS - RAP SYSTOLE: 3 MMHG
BH CV ECHO MEAS - RV MAX PG: 1.04 MMHG
BH CV ECHO MEAS - RV V1 MAX: 51 CM/SEC
BH CV ECHO MEAS - RV V1 VTI: 10.4 CM
BH CV ECHO MEAS - RVOT DIAM: 1.73 CM
BH CV ECHO MEAS - RVSP: 22 MMHG
BH CV ECHO MEAS - SI(MOD-SP2): 25.3 ML/M2
BH CV ECHO MEAS - SI(MOD-SP4): 36.1 ML/M2
BH CV ECHO MEAS - SUP REN AO DIAM: 2.8 CM
BH CV ECHO MEAS - SV(LVOT): 62 ML
BH CV ECHO MEAS - SV(MOD-SP2): 42 ML
BH CV ECHO MEAS - SV(MOD-SP4): 60 ML
BH CV ECHO MEAS - SV(RVOT): 24.3 ML
BH CV ECHO MEAS - TAPSE (>1.6): 2.28 CM
BH CV ECHO MEAS - TR MAX PG: 18.9 MMHG
BH CV ECHO MEAS - TR MAX VEL: 217.2 CM/SEC
BH CV ECHO MEASUREMENTS AVERAGE E/E' RATIO: 8.41
BH CV XLRA - RV BASE: 2.9 CM
BH CV XLRA - RV LENGTH: 6.8 CM
BH CV XLRA - RV MID: 2.23 CM
BH CV XLRA - TDI S': 12.2 CM/SEC
SINUS: 2.9 CM
STJ: 2.5 CM

## 2024-01-02 PROCEDURE — 93306 TTE W/DOPPLER COMPLETE: CPT

## 2024-01-02 NOTE — PROGRESS NOTES
I am covering Dr. Dickinson's in basket today.    Please call patient.  Echocardiogram showed normal LVEF and mild mitral regurgitation and tricuspid regurgitation.  Very stable.  Nothing on the echocardiogram that would have contributed to her passing out episode or shortness of breath.  I would recommend that she follow-up with her PCP about the shortness of breath and productive cough.  I will defer to Dr. Dickinson when she wants the patient to come back to the office.

## 2024-01-03 ENCOUNTER — TELEPHONE (OUTPATIENT)
Dept: CARDIOLOGY | Facility: CLINIC | Age: 69
End: 2024-01-03
Payer: MEDICARE

## 2024-01-03 NOTE — TELEPHONE ENCOUNTER
Called and left VM, will continue to try to reach pt.    HUB- please put patient straight through to triage    Alondra Emmanuel, RN  Triage RN  01/03/24 08:32 EST

## 2024-01-03 NOTE — TELEPHONE ENCOUNTER
----- Message from SAMMIE Barclay sent at 1/2/2024  4:04 PM EST -----  I am covering Dr. Dickinson's in basket today.    Please call patient.  Echocardiogram showed normal LVEF and mild mitral regurgitation and tricuspid regurgitation.  Very stable.  Nothing on the echocardiogram that would have contributed to her passing out episode or shortness of breath.  I would recommend that she follow-up with her PCP about the shortness of breath and productive cough.  I will defer to Dr. Dickinson when she wants the patient to come back to the office.

## 2024-01-03 NOTE — TELEPHONE ENCOUNTER
I spoke with Joan Gonzales and updated pt on results/recommendations from provider.  Pt verbalized understanding and has no further questions at this time.    Thank you,    Debi KIM, RN  Triage [facility]  01/03/24 09:21 EST

## 2024-01-26 NOTE — PROGRESS NOTES
Chief Complaint  Asthma    Subjective        HPI   Joan presents to [facility] for follow up on respiratory issues.    She started Fosamax for osteoporosis then noticed with dyspnea and frothy sputum  Had some really bad heartburn with the Fosamax, stopped it  She then had the syncopal episode, thinks it was due to it being very hot and also wonders if her oxygen dropped.  CT chest showed mucus plugging but nothing else  No hx asthma or COPD  Never smoked but secondhand smoke exposure  Prednisone really helped her sxs  Sxs better after steroids but, still has a cough with sputum production  Feels like there's something in her neck/throat although no dysphagia  Took down flocked [employer] tree a few weeks ago, had an asthma attack after that  CT with mucus plugging, coughs up sputum  Coughing and wheezing worse at night  Has dyspnea on exertion  Has been taking albuterol prn which helps  Ripped out 30+ year old carpets in November 2022, had a cough off and on after that.   Saw cardiology, all cardiac work up negative        Objective   Vital Signs:  Vitals:    01/29/24 0934 01/29/24 1021   BP: 109/68    Pulse: 76    Temp: 98.2 °F (36.8 °C)    SpO2: 94% 96%   Weight: 59.7 kg (131 lb 9.6 oz)           Physical Exam  Constitutional:       General: She is not in acute distress.     Appearance: Normal appearance. She is not toxic-appearing.   HENT:      Head: Normocephalic and atraumatic.      Mouth/Throat:      Mouth: Mucous membranes are moist.   Eyes:      General: No scleral icterus.     Conjunctiva/sclera: Conjunctivae normal.   Neck:      Comments: Possible thyromegaly  Cardiovascular:      Rate and Rhythm: Normal rate and regular rhythm.      Heart sounds: Normal heart sounds. No murmur heard.     No friction rub. No gallop.   Pulmonary:      Effort: Pulmonary effort is normal. No respiratory distress.      Breath sounds: Normal breath sounds.   Musculoskeletal:         General: No  swelling, tenderness or deformity. Normal range of motion.      Cervical back: Normal range of motion and neck supple. No tenderness.   Skin:     General: Skin is warm and dry.      Findings: No lesion or rash.   Neurological:      General: No focal deficit present.      Mental Status: She is alert and oriented to person, place, and time.   Psychiatric:         Mood and Affect: Mood normal.         Behavior: Behavior normal.         Judgment: Judgment normal.          Result Review :     The following data was reviewed by: Adry Aggarwal MD on 01/29/2024:  Office Visit with Radha Dickinson MD (12/21/2023)   Adult Transthoracic Echo Complete W/ Cont if Necessary Per Protocol (01/02/2024 15:15)          Assessment and Plan    Diagnoses and all orders for this visit:    1. Chronic cough (Primary)  Assessment & Plan:  Could be asthma although unusual for it to present late in life with no prior hx. She does have some classic COPD/asthma sxs including WRIGHT, cough w sputum production, wheezing. Had some mucus plugging on CT chest. Seems to get worse with allergic triggers. Using albuterol prn which helps (steroids also helped) but need to get an actual dx. Check PFTs and refer to pulmonary. She also notes some fullness in her neck, feels like there's something stuck in her throat. Could have a goiter causing sxs hence the thyroid ultrasound. Also considered upper airway issue such as VCD in ddx, may consider ENT consult depending on what PFTs show. Cardiac work up has been negative thus far.     Orders:  -     CBC Auto Differential  -     US Thyroid; Future  -     Ambulatory Referral to Pulmonology  -     albuterol sulfate  (90 Base) MCG/ACT inhaler; Inhale 2 puffs Every 4 (Four) Hours As Needed for Wheezing or Shortness of Air.  Dispense: 6.7 g; Refill: 1  -     Complete PFT - Pre & Post Bronchodilator; Future  -     Hemoglobin; Future    2. Vitamin D insufficiency  -     Vitamin D,25-Hydroxy    3.  Hyperlipidemia, unspecified hyperlipidemia type  Assessment & Plan:  Re-checking lipids today, fasting    Orders:  -     Lipid Panel    4. Prediabetes  Assessment & Plan:  A1c and FLP today    Orders:  -     Basic Metabolic Panel  -     Hemoglobin A1c    5. Localized osteoporosis without current pathological fracture  Assessment & Plan:  DEXA Bone Density Axial (11/06/2023 07:33)   T scores -3.2  Recommended Fosamax but pt had some GI sxs from that  Will discuss at next visit Raloxifene versus IV Reclast, prefer to avoid Prolia if possible      6. Wheezing  -     US Thyroid; Future  -     Ambulatory Referral to Pulmonology  -     albuterol sulfate  (90 Base) MCG/ACT inhaler; Inhale 2 puffs Every 4 (Four) Hours As Needed for Wheezing or Shortness of Air.  Dispense: 6.7 g; Refill: 1  -     Complete PFT - Pre & Post Bronchodilator; Future  -     Hemoglobin; Future    7. Neck fullness  -     US Thyroid; Future  -     Complete PFT - Pre & Post Bronchodilator; Future  -     Hemoglobin; Future        Follow Up   Return in about 4 weeks (around 2/26/2024) for Medicare Wellness and regular appt-30 minutes.  Patient was given instructions and counseling regarding her condition or for health maintenance advice. Please see specific information pulled into the AVS if appropriate.

## 2024-01-29 ENCOUNTER — OFFICE VISIT (OUTPATIENT)
Dept: FAMILY MEDICINE CLINIC | Facility: CLINIC | Age: 69
End: 2024-01-29
Payer: MEDICARE

## 2024-01-29 VITALS
HEART RATE: 76 BPM | BODY MASS INDEX: 21.9 KG/M2 | TEMPERATURE: 98.2 F | OXYGEN SATURATION: 96 % | DIASTOLIC BLOOD PRESSURE: 68 MMHG | WEIGHT: 131.6 LBS | SYSTOLIC BLOOD PRESSURE: 109 MMHG

## 2024-01-29 DIAGNOSIS — R06.2 WHEEZING: ICD-10-CM

## 2024-01-29 DIAGNOSIS — R22.1 NECK FULLNESS: ICD-10-CM

## 2024-01-29 DIAGNOSIS — E78.5 HYPERLIPIDEMIA, UNSPECIFIED HYPERLIPIDEMIA TYPE: ICD-10-CM

## 2024-01-29 DIAGNOSIS — E55.9 VITAMIN D INSUFFICIENCY: ICD-10-CM

## 2024-01-29 DIAGNOSIS — M81.6 LOCALIZED OSTEOPOROSIS WITHOUT CURRENT PATHOLOGICAL FRACTURE: ICD-10-CM

## 2024-01-29 DIAGNOSIS — R05.3 CHRONIC COUGH: Primary | ICD-10-CM

## 2024-01-29 DIAGNOSIS — R73.03 PREDIABETES: ICD-10-CM

## 2024-01-29 PROBLEM — R92.8 ABNORMAL MAMMOGRAM: Status: RESOLVED | Noted: 2023-11-14 | Resolved: 2024-01-29

## 2024-01-29 PROCEDURE — 99214 OFFICE O/P EST MOD 30 MIN: CPT | Performed by: INTERNAL MEDICINE

## 2024-01-29 PROCEDURE — 1159F MED LIST DOCD IN RCRD: CPT | Performed by: INTERNAL MEDICINE

## 2024-01-29 PROCEDURE — 1160F RVW MEDS BY RX/DR IN RCRD: CPT | Performed by: INTERNAL MEDICINE

## 2024-01-29 RX ORDER — ALBUTEROL SULFATE 90 UG/1
2 AEROSOL, METERED RESPIRATORY (INHALATION) EVERY 4 HOURS PRN
Qty: 6.7 G | Refills: 1 | Status: SHIPPED | OUTPATIENT
Start: 2024-01-29

## 2024-01-29 NOTE — ASSESSMENT & PLAN NOTE
Could be asthma although unusual for it to present late in life with no prior hx. She does have some classic COPD/asthma sxs including WRIGHT, cough w sputum production, wheezing. Had some mucus plugging on CT chest. Seems to get worse with allergic triggers. Using albuterol prn which helps (steroids also helped) but need to get an actual dx. Check PFTs and refer to pulmonary. She also notes some fullness in her neck, feels like there's something stuck in her throat. Could have a goiter causing sxs hence the thyroid ultrasound. Also considered upper airway issue such as VCD in ddx, may consider ENT consult depending on what PFTs show. Cardiac work up has been negative thus far.

## 2024-01-29 NOTE — ASSESSMENT & PLAN NOTE
DEXA Bone Density Axial (11/06/2023 07:33)   T scores -3.2  Recommended Fosamax but pt had some GI sxs from that  Will discuss at next visit Raloxifene versus IV Reclast, prefer to avoid Prolia if possible

## 2024-01-30 ENCOUNTER — TELEPHONE (OUTPATIENT)
Dept: FAMILY MEDICINE CLINIC | Facility: CLINIC | Age: 69
End: 2024-01-30
Payer: MEDICARE

## 2024-01-30 DIAGNOSIS — E55.9 VITAMIN D INSUFFICIENCY: ICD-10-CM

## 2024-01-30 DIAGNOSIS — J45.40 MODERATE PERSISTENT ASTHMA, UNSPECIFIED WHETHER COMPLICATED: Primary | ICD-10-CM

## 2024-01-30 LAB
25(OH)D3+25(OH)D2 SERPL-MCNC: 35.1 NG/ML (ref 30–100)
BASOPHILS # BLD AUTO: 0.07 10*3/MM3 (ref 0–0.2)
BASOPHILS NFR BLD AUTO: 1.2 % (ref 0–1.5)
BUN SERPL-MCNC: 10 MG/DL (ref 8–23)
BUN/CREAT SERPL: 16.7 (ref 7–25)
CALCIUM SERPL-MCNC: 8.6 MG/DL (ref 8.6–10.5)
CHLORIDE SERPL-SCNC: 105 MMOL/L (ref 98–107)
CHOLEST SERPL-MCNC: 266 MG/DL (ref 0–200)
CO2 SERPL-SCNC: 27.7 MMOL/L (ref 22–29)
CREAT SERPL-MCNC: 0.6 MG/DL (ref 0.57–1)
EGFRCR SERPLBLD CKD-EPI 2021: 98.5 ML/MIN/1.73
EOSINOPHIL # BLD AUTO: 0.89 10*3/MM3 (ref 0–0.4)
EOSINOPHIL NFR BLD AUTO: 14.6 % (ref 0.3–6.2)
ERYTHROCYTE [DISTWIDTH] IN BLOOD BY AUTOMATED COUNT: 12.8 % (ref 12.3–15.4)
GLUCOSE SERPL-MCNC: 109 MG/DL (ref 65–99)
HBA1C MFR BLD: 5.7 % (ref 4.8–5.6)
HCT VFR BLD AUTO: 42.8 % (ref 34–46.6)
HDLC SERPL-MCNC: 62 MG/DL (ref 40–60)
HGB BLD-MCNC: 14.7 G/DL (ref 12–15.9)
IMM GRANULOCYTES # BLD AUTO: 0.01 10*3/MM3 (ref 0–0.05)
IMM GRANULOCYTES NFR BLD AUTO: 0.2 % (ref 0–0.5)
LDLC SERPL CALC-MCNC: 169 MG/DL (ref 0–100)
LYMPHOCYTES # BLD AUTO: 1.94 10*3/MM3 (ref 0.7–3.1)
LYMPHOCYTES NFR BLD AUTO: 31.9 % (ref 19.6–45.3)
MCH RBC QN AUTO: 31.5 PG (ref 26.6–33)
MCHC RBC AUTO-ENTMCNC: 34.3 G/DL (ref 31.5–35.7)
MCV RBC AUTO: 91.8 FL (ref 79–97)
MONOCYTES # BLD AUTO: 0.34 10*3/MM3 (ref 0.1–0.9)
MONOCYTES NFR BLD AUTO: 5.6 % (ref 5–12)
NEUTROPHILS # BLD AUTO: 2.83 10*3/MM3 (ref 1.7–7)
NEUTROPHILS NFR BLD AUTO: 46.5 % (ref 42.7–76)
NRBC BLD AUTO-RTO: 0 /100 WBC (ref 0–0.2)
PLATELET # BLD AUTO: 212 10*3/MM3 (ref 140–450)
POTASSIUM SERPL-SCNC: 4.2 MMOL/L (ref 3.5–5.2)
RBC # BLD AUTO: 4.66 10*6/MM3 (ref 3.77–5.28)
SODIUM SERPL-SCNC: 142 MMOL/L (ref 136–145)
TRIGL SERPL-MCNC: 192 MG/DL (ref 0–150)
VLDLC SERPL CALC-MCNC: 35 MG/DL (ref 5–40)
WBC # BLD AUTO: 6.08 10*3/MM3 (ref 3.4–10.8)

## 2024-01-30 RX ORDER — FLUTICASONE FUROATE AND VILANTEROL 200; 25 UG/1; UG/1
1 POWDER RESPIRATORY (INHALATION)
Qty: 28 EACH | Refills: 6 | Status: SHIPPED | OUTPATIENT
Start: 2024-01-30 | End: 2024-01-31 | Stop reason: SDUPTHER

## 2024-01-30 RX ORDER — ERGOCALCIFEROL 1.25 MG/1
50000 CAPSULE ORAL WEEKLY
Qty: 12 CAPSULE | Refills: 1 | Status: SHIPPED | OUTPATIENT
Start: 2024-01-30

## 2024-01-30 NOTE — TELEPHONE ENCOUNTER
Caller: Joan Gonzales    Relationship to patient: Self    Best call back number: [phone]    Patient is needing: PATIENT STATES THAT HER COPAY ON THE   Fluticasone Furoate-Vilanterol (Breo Ellipta) 200-25 MCG/ACT inhaler   IS GOING BE LIKE $300 AND WOULD LIKE TO SEE ABOUT GETTING SOMETHING SENT IN THAT IS LESS EXPENSIVE. PLEASE ADVISE.     SHE WOULD LIKE TO HAVE A CALLBACK ASAP

## 2024-01-31 RX ORDER — BUDESONIDE AND FORMOTEROL FUMARATE DIHYDRATE 160; 4.5 UG/1; UG/1
2 AEROSOL RESPIRATORY (INHALATION)
Qty: 6 G | Refills: 12 | Status: SHIPPED | OUTPATIENT
Start: 2024-01-31

## 2024-01-31 NOTE — TELEPHONE ENCOUNTER
Spoke with this patient in detail. Voiced understanding. NO further questions noted at this time.    (0) independent

## 2024-02-06 ENCOUNTER — HOSPITAL ENCOUNTER (OUTPATIENT)
Dept: ULTRASOUND IMAGING | Facility: HOSPITAL | Age: 69
Discharge: HOME | End: 2024-02-06
Admitting: INTERNAL MEDICINE
Payer: MEDICARE

## 2024-02-06 DIAGNOSIS — R05.3 CHRONIC COUGH: ICD-10-CM

## 2024-02-06 DIAGNOSIS — R94.6 ABNORMAL THYROID SCAN: Primary | ICD-10-CM

## 2024-02-06 DIAGNOSIS — R06.2 WHEEZING: ICD-10-CM

## 2024-02-06 DIAGNOSIS — R22.1 NECK FULLNESS: ICD-10-CM

## 2024-02-06 PROCEDURE — 76536 US EXAM OF HEAD AND NECK: CPT

## 2024-02-07 ENCOUNTER — TELEPHONE (OUTPATIENT)
Dept: FAMILY MEDICINE CLINIC | Facility: CLINIC | Age: 69
End: 2024-02-07
Payer: MEDICARE

## 2024-02-07 ENCOUNTER — LAB (OUTPATIENT)
Dept: LAB | Facility: HOSPITAL | Age: 69
End: 2024-02-07
Payer: MEDICARE

## 2024-02-07 ENCOUNTER — HOSPITAL ENCOUNTER (OUTPATIENT)
Dept: RESPIRATORY THERAPY | Facility: HOSPITAL | Age: 69
Discharge: HOME | End: 2024-02-07
Payer: MEDICARE

## 2024-02-07 DIAGNOSIS — R05.3 CHRONIC COUGH: ICD-10-CM

## 2024-02-07 DIAGNOSIS — R22.1 NECK FULLNESS: ICD-10-CM

## 2024-02-07 DIAGNOSIS — R06.2 WHEEZING: ICD-10-CM

## 2024-02-07 LAB
BASOPHILS # BLD AUTO: 0.06 10*3/MM3 (ref 0–0.2)
BASOPHILS NFR BLD AUTO: 0.8 % (ref 0–1.5)
DEPRECATED RDW RBC AUTO: 43.7 FL (ref 37–54)
EOSINOPHIL # BLD AUTO: 1.01 10*3/MM3 (ref 0–0.4)
EOSINOPHIL NFR BLD AUTO: 13.3 % (ref 0.3–6.2)
ERYTHROCYTE [DISTWIDTH] IN BLOOD BY AUTOMATED COUNT: 12.8 % (ref 12.3–15.4)
HCT VFR BLD AUTO: 42.5 % (ref 34–46.6)
HGB BLD-MCNC: 14 G/DL (ref 12–15.9)
IMM GRANULOCYTES # BLD AUTO: 0.03 10*3/MM3 (ref 0–0.05)
IMM GRANULOCYTES NFR BLD AUTO: 0.4 % (ref 0–0.5)
LYMPHOCYTES # BLD AUTO: 2.11 10*3/MM3 (ref 0.7–3.1)
LYMPHOCYTES NFR BLD AUTO: 27.8 % (ref 19.6–45.3)
MCH RBC QN AUTO: 30.8 PG (ref 26.6–33)
MCHC RBC AUTO-ENTMCNC: 32.9 G/DL (ref 31.5–35.7)
MCV RBC AUTO: 93.4 FL (ref 79–97)
MONOCYTES # BLD AUTO: 0.39 10*3/MM3 (ref 0.1–0.9)
MONOCYTES NFR BLD AUTO: 5.1 % (ref 5–12)
NEUTROPHILS NFR BLD AUTO: 3.98 10*3/MM3 (ref 1.7–7)
NEUTROPHILS NFR BLD AUTO: 52.6 % (ref 42.7–76)
NRBC BLD AUTO-RTO: 0 /100 WBC (ref 0–0.2)
PLATELET # BLD AUTO: 200 10*3/MM3 (ref 140–450)
PMV BLD AUTO: 8.6 FL (ref 6–12)
RBC # BLD AUTO: 4.55 10*6/MM3 (ref 3.77–5.28)
T3FREE SERPL-MCNC: 2.3 PG/ML (ref 2–4.4)
T4 FREE SERPL-MCNC: 0.78 NG/DL (ref 0.93–1.7)
TSH SERPL DL<=0.05 MIU/L-ACNC: 2.12 UIU/ML (ref 0.27–4.2)
WBC NRBC COR # BLD AUTO: 7.58 10*3/MM3 (ref 3.4–10.8)

## 2024-02-07 PROCEDURE — 84443 ASSAY THYROID STIM HORMONE: CPT | Performed by: INTERNAL MEDICINE

## 2024-02-07 PROCEDURE — 94640 AIRWAY INHALATION TREATMENT: CPT

## 2024-02-07 PROCEDURE — 36415 COLL VENOUS BLD VENIPUNCTURE: CPT | Performed by: INTERNAL MEDICINE

## 2024-02-07 PROCEDURE — 84439 ASSAY OF FREE THYROXINE: CPT | Performed by: INTERNAL MEDICINE

## 2024-02-07 PROCEDURE — 86376 MICROSOMAL ANTIBODY EACH: CPT | Performed by: INTERNAL MEDICINE

## 2024-02-07 PROCEDURE — 94060 EVALUATION OF WHEEZING: CPT

## 2024-02-07 PROCEDURE — 94726 PLETHYSMOGRAPHY LUNG VOLUMES: CPT

## 2024-02-07 PROCEDURE — 84481 FREE ASSAY (FT-3): CPT | Performed by: INTERNAL MEDICINE

## 2024-02-07 PROCEDURE — 94729 DIFFUSING CAPACITY: CPT

## 2024-02-07 PROCEDURE — 85025 COMPLETE CBC W/AUTO DIFF WBC: CPT | Performed by: INTERNAL MEDICINE

## 2024-02-07 RX ORDER — ALBUTEROL SULFATE 2.5 MG/3ML
2.5 SOLUTION RESPIRATORY (INHALATION) ONCE
Status: COMPLETED | OUTPATIENT
Start: 2024-02-07 | End: 2024-02-07

## 2024-02-07 RX ADMIN — ALBUTEROL SULFATE 2.5 MG: 2.5 SOLUTION RESPIRATORY (INHALATION) at 12:47

## 2024-02-07 NOTE — TELEPHONE ENCOUNTER
----- Message from Adry Aggarwal MD sent at 2/6/2024  5:29 PM EST -----  Notify Joan that thyroid ultrasound showed possible inflammation of the thyroid which can have lots of causes. My recommendation would be to check thyroid function tests (previously normal) and we may consider an endocrinology referral. Please have her come for a lab only appt for thyroid tests.

## 2024-02-07 NOTE — TELEPHONE ENCOUNTER
"Relay     \"Thyroid US showed possible inflammation of the thyroid; which can have lots of causes.  Provider's recommendation would be to check thyroid function tests (previously normal) and may consider an endocrinology referral.  Please either go to room 140 (downstairs) or call the office to schedule a lab appt for the thyroid tests.\"          "

## 2024-02-08 LAB — THYROPEROXIDASE AB SERPL-ACNC: 145 IU/ML (ref 0–34)

## 2024-02-09 DIAGNOSIS — R73.03 PREDIABETES: ICD-10-CM

## 2024-02-09 DIAGNOSIS — M81.6 LOCALIZED OSTEOPOROSIS WITHOUT CURRENT PATHOLOGICAL FRACTURE: ICD-10-CM

## 2024-02-09 DIAGNOSIS — R76.8 ANTI-TPO ANTIBODIES PRESENT: ICD-10-CM

## 2024-02-09 DIAGNOSIS — E06.9 THYROIDITIS: Primary | ICD-10-CM

## 2024-02-12 ENCOUNTER — OFFICE VISIT (OUTPATIENT)
Dept: ENDOCRINOLOGY | Age: 69
End: 2024-02-12
Payer: MEDICARE

## 2024-02-12 VITALS
OXYGEN SATURATION: 99 % | DIASTOLIC BLOOD PRESSURE: 72 MMHG | HEIGHT: 65 IN | BODY MASS INDEX: 21.92 KG/M2 | WEIGHT: 131.6 LBS | HEART RATE: 64 BPM | SYSTOLIC BLOOD PRESSURE: 116 MMHG

## 2024-02-12 DIAGNOSIS — R73.03 PREDIABETES: ICD-10-CM

## 2024-02-12 DIAGNOSIS — M81.6 LOCALIZED OSTEOPOROSIS WITHOUT CURRENT PATHOLOGICAL FRACTURE: Primary | ICD-10-CM

## 2024-02-12 DIAGNOSIS — E06.3 HASHIMOTO'S DISEASE: ICD-10-CM

## 2024-02-12 PROCEDURE — 1160F RVW MEDS BY RX/DR IN RCRD: CPT | Performed by: INTERNAL MEDICINE

## 2024-02-12 PROCEDURE — 1159F MED LIST DOCD IN RCRD: CPT | Performed by: INTERNAL MEDICINE

## 2024-02-12 PROCEDURE — 99204 OFFICE O/P NEW MOD 45 MIN: CPT | Performed by: INTERNAL MEDICINE

## 2024-02-12 RX ORDER — FLUTICASONE FUROATE AND VILANTEROL TRIFENATATE 200; 25 UG/1; UG/1
POWDER RESPIRATORY (INHALATION)
COMMUNITY
Start: 2024-01-31

## 2024-02-15 ENCOUNTER — PATIENT ROUNDING (BHMG ONLY) (OUTPATIENT)
Dept: ENDOCRINOLOGY | Age: 69
End: 2024-02-15
Payer: MEDICARE

## 2024-04-09 ENCOUNTER — TELEPHONE (OUTPATIENT)
Dept: FAMILY MEDICINE CLINIC | Facility: CLINIC | Age: 69
End: 2024-04-09
Payer: MEDICARE

## 2024-04-09 NOTE — TELEPHONE ENCOUNTER
Called and spoke to pt she will call back to schedule AWV for this year    Ok for hub to relay and schedule

## 2024-04-12 ENCOUNTER — TRANSCRIBE ORDERS (OUTPATIENT)
Dept: ADMINISTRATIVE | Facility: HOSPITAL | Age: 69
End: 2024-04-12
Payer: MEDICARE

## 2024-04-12 DIAGNOSIS — R05.3 CHRONIC COUGH: Primary | ICD-10-CM

## 2024-05-10 NOTE — PROGRESS NOTES
The ABCs of the Annual Wellness Visit  Subsequent Medicare Wellness Visit    Subjective    Joan Gonzales is a 68 y.o. female who presents for a Subsequent Medicare Wellness Visit.    The following portions of the patient's history were reviewed and   updated as appropriate: allergies, current medications, past family history, past medical history, past social history, past surgical history, and problem list.    Compared to one year ago, the patient feels her physical   health is worse.    Compared to one year ago, the patient feels her mental   health is the same.    Recent Hospitalizations:  She was not admitted to the hospital during the last year.       Current Medical Providers:  Patient Care Team:  Ardy Aggarwal MD as PCP - General (Internal Medicine)  Dr. Kellie Diaz- pulmonary  Dr. Cheng Mosqueda- endocrine  Dr. Destiney Velazquez- psychiatry    Outpatient Medications Prior to Visit   Medication Sig Dispense Refill    albuterol sulfate  (90 Base) MCG/ACT inhaler Inhale 2 puffs Every 4 (Four) Hours As Needed for Wheezing or Shortness of Air. 6.7 g 1    Breo Ellipta 200-25 MCG/ACT inhaler       montelukast (SINGULAIR) 10 MG tablet       Spiriva Respimat 1.25 MCG/ACT aerosol solution inhaler       venlafaxine (EFFEXOR) 75 MG tablet Take 1 tablet by mouth Daily.      vitamin D (ERGOCALCIFEROL) 1.25 MG (28737 UT) capsule capsule Take 1 capsule by mouth 1 (One) Time Per Week. 12 capsule 1    budesonide-formoterol (Symbicort) 160-4.5 MCG/ACT inhaler Inhale 2 puffs 2 (Two) Times a Day. (Patient not taking: Reported on 2/12/2024) 6 g 12     No facility-administered medications prior to visit.       No opioid medication identified on active medication list. I have reviewed chart for other potential  high risk medication/s and harmful drug interactions in the elderly.      Aspirin is not on active medication list.  Aspirin use is not indicated based on review of current medical condition/s. Risk of harm  "outweighs potential benefits.  .    Patient Active Problem List   Diagnosis    Routine health maintenance    Osteoporosis    Other fatigue    PTSD (post-traumatic stress disorder)    Anxiety    Tinnitus of right ear    Hyperlipidemia    Prediabetes    Vitamin D insufficiency    Wheezing    Chronic cough    Reactive airway disease without complication    Eosinophil count raised    Hashimoto's thyroiditis     Advance Care Planning   Advance Care Planning     Advance Directive is not on file.  ACP discussion was held with the patient during this visit. Patient does not have an advance directive, information provided.     Objective    Vitals:    05/15/24 0806   BP: 134/84   BP Location: Left arm   Patient Position: Sitting   Cuff Size: Adult   Pulse: 67   SpO2: 98%   Weight: 61.6 kg (135 lb 14.4 oz)   Height: 165.1 cm (65\")     Estimated body mass index is 22.61 kg/m² as calculated from the following:    Height as of this encounter: 165.1 cm (65\").    Weight as of this encounter: 61.6 kg (135 lb 14.4 oz).    BMI is within normal parameters. No other follow-up for BMI required.    Does the patient have evidence of cognitive impairment? No        HEALTH RISK ASSESSMENT    Smoking Status:  Social History     Tobacco Use   Smoking Status Never   Smokeless Tobacco Never     Alcohol Consumption:  Social History     Substance and Sexual Activity   Alcohol Use Yes    Alcohol/week: 3.0 standard drinks of alcohol    Types: 3 Glasses of wine per week     Fall Risk Screen:    STEADI Fall Risk Assessment was completed, and patient is at LOW risk for falls.Assessment completed on:5/15/2024    Depression Screenin/15/2024     8:08 AM   PHQ-2/PHQ-9 Depression Screening   Little Interest or Pleasure in Doing Things 0-->not at all   Feeling Down, Depressed or Hopeless 0-->not at all   PHQ-9: Brief Depression Severity Measure Score 0       Health Habits and Functional and Cognitive Screenin/10/2024    11:35 AM "   Functional & Cognitive Status   Do you have difficulty preparing food and eating? No   Do you have difficulty bathing yourself, getting dressed or grooming yourself? No   Do you have difficulty using the toilet? No   Do you have difficulty moving around from place to place? No   Do you have trouble with steps or getting out of a bed or a chair? No   Current Diet Well Balanced Diet   Dental Exam Up to date   Eye Exam Up to date   Exercise (times per week) 1 times per week   Current Exercises Include Gardening   Do you need help using the phone?  No   Are you deaf or do you have serious difficulty hearing?  No   Do you need help to go to places out of walking distance? No   Do you need help shopping? No   Do you need help preparing meals?  No   Do you need help with housework?  No   Do you need help with laundry? No   Do you need help taking your medications? No   Do you need help managing money? No   Do you ever drive or ride in a car without wearing a seat belt? No   Have you felt unusual stress, anger or loneliness in the last month? No   Who do you live with? Alone   If you need help, do you have trouble finding someone available to you? No   Have you been bothered in the last four weeks by sexual problems? No   Do you have difficulty concentrating, remembering or making decisions? No       Age-appropriate Screening Schedule:  Refer to the list below for future screening recommendations based on patient's age, sex and/or medical conditions. Orders for these recommended tests are listed in the plan section. The patient has been provided with a written plan.     Health Maintenance   Topic Date Due    TDAP/TD VACCINES (1 - Tdap) Never done    RSV Vaccine - Adults (1 - 1-dose 60+ series) Never done    COVID-19 Vaccine (1 - 2023-24 season) Never done    ZOSTER VACCINE (1 of 2) 01/29/2025 (Originally 10/28/2006)    COLORECTAL CANCER SCREENING  10/01/2027 (Originally 1955)    INFLUENZA VACCINE  08/01/2024     LIPID PANEL  01/29/2025    ANNUAL WELLNESS VISIT  05/15/2025    DXA SCAN  11/06/2025    MAMMOGRAM  11/29/2025    HEPATITIS C SCREENING  Completed    Pneumococcal Vaccine 65+  Completed          CMS Preventative Services Quick Reference  Risk Factors Identified During Encounter  Immunizations Discussed/Encouraged: Tdap, Influenza, Prevnar 20 (Pneumococcal 20-valent conjugate), COVID19, and RSV (Respiratory Syncytial Virus)  Inactivity/Sedentary: Patient was advised to exercise at least 150 minutes a week per CDC recommendations.  The above risks/problems have been discussed with the patient.  Pertinent information has been shared with the patient in the After Visit Summary.  An After Visit Summary and PPPS were made available to the patient.    Follow Up:   Next Medicare Wellness visit to be scheduled in 1 year.       Additional E&M Note during same encounter follows:  Patient has multiple medical problems which are significant and separately identifiable that require additional work above and beyond the Medicare Wellness Visit.      Chief Complaint  Medicare Wellness-subsequent    Subjective        HPI  Joan Gonzales is also being seen today for osteoporosis, HLD, hashimotos thyroiditis.     Told she had RAD by Dr. Diaz. Started on Zyrtec and Singulair. Was having chest tightness/heaviness in the mornings, told she has cough variant asthma, started on Spiriva and Prilosec in addition to Breo. Biologic has discussed due to high eos, but Dr. Diaz wanted her to give Spiriva a try first. She will have a follow up in 6 mos. States that if there are still mucus plugs on repeat CT, will need bronch. Has been working in the yard and coughing a lot. Having some thick sputum. Inhalers have been very expensive.     Re HLD and pre-DM:  Dad had MI in 60s  Does not want medicines  Does not want to see dietician  Would like to work on diet and exercise    The 10-year ASCVD risk score (Eber ZAYAS, et al., 2019) is: 8.2%    Values  "used to calculate the score:      Age: 68 years      Sex: Female      Is Non- : No      Diabetic: No      Tobacco smoker: No      Systolic Blood Pressure: 134 mmHg      Is BP treated: No      HDL Cholesterol: 62 mg/dL      Total Cholesterol: 266 mg/dL       Objective   Vital Signs:  /84 (BP Location: Left arm, Patient Position: Sitting, Cuff Size: Adult)   Pulse 67   Ht 165.1 cm (65\")   Wt 61.6 kg (135 lb 14.4 oz)   SpO2 98%   BMI 22.61 kg/m²     Physical Exam  Constitutional:       General: She is not in acute distress.     Appearance: Normal appearance.   HENT:      Head: Normocephalic and atraumatic.   Eyes:      Conjunctiva/sclera: Conjunctivae normal.   Cardiovascular:      Rate and Rhythm: Normal rate and regular rhythm.   Pulmonary:      Effort: Pulmonary effort is normal. No respiratory distress.      Breath sounds: No wheezing, rhonchi or rales.   Musculoskeletal:         General: No swelling or deformity.   Skin:     Coloration: Skin is not jaundiced.      Findings: No rash.   Neurological:      General: No focal deficit present.      Mental Status: She is alert and oriented to person, place, and time.   Psychiatric:         Mood and Affect: Mood normal.         Behavior: Behavior normal.         Judgment: Judgment normal.          The following data was reviewed by: Adry Aggarwal MD on 05/15/2024:          PULMONARY - SCAN - F/U - RECHECK OF COUGH, LPC, 4/11/2024 (04/11/2024)      Assessment and Plan   Diagnoses and all orders for this visit:    1. Encounter for subsequent annual wellness visit (AWV) in Medicare patient (Primary)    2. Eosinophil count raised  -     Cancel: CBC Auto Differential  -     CBC Auto Differential    3. Hashimoto's thyroiditis  Assessment & Plan:  Thyroiditis on ultrasound and +TPO ab  Pt would like to check TSH today, has been normal    Orders:  -     Cancel: TSH Rfx On Abnormal To Free T4  -     TSH Rfx On Abnormal To Free T4    4. Need " for vaccination  -     Pneumococcal Conjugate Vaccine 20-Valent (PCV20)    5. Encounter for screening mammogram for malignant neoplasm of breast  -     Mammo Screening Digital Tomosynthesis Bilateral With CAD; Future    6. Vitamin D insufficiency  Assessment & Plan:  Check Vit D level  Currently on high dose Vitamin D    Orders:  -     Vitamin D,25-Hydroxy    7. Reactive airway disease without complication, unspecified asthma severity, unspecified whether persistent  Assessment & Plan:  Follows with Dr. Diaz  Dx with cough variant asthma, also with high eos  On Breo and Spriva but very expensive  Told pt to call insurance to see if Trelegy or Breztri covered and to check with Dr. Diaz if OK to switch to that if covered  Pt would like eos checked today. Noted that Dr. Diaz considering biologic given hypereosinophilia.       8. Localized osteoporosis without current pathological fracture  Assessment & Plan:  DEXA Bone Density Axial (11/06/2023 07:33)   T scores -3.2  Recommended Fosamax but pt had some GI sxs from that  Patient saw endocrine, Prolia versus Reclast recommended.  Patient still thinking about it.      9. Routine health maintenance  Assessment & Plan:  Cologuard - Stool, Per Rectum (11/06/2023 06:30) Negative, repeat 11/2026   Mammogram ordered, due in November  DEXA due 11/2025  Discussed recommended vaccines, [id number] today. Rec RSV, COVID, tdap at local pharmacy  Labs today  No need for cervical CA screening, s/p hyst in her 30s for endometriosis, no abnormal paps  Gave pt information on living will      10. Hyperlipidemia, unspecified hyperlipidemia type  Assessment & Plan:  The 10-year ASCVD risk score (Eber ZAYAS, et al., 2019) is: 8.2%    Values used to calculate the score:      Age: 68 years      Sex: Female      Is Non- : No      Diabetic: No      Tobacco smoker: No      Systolic Blood Pressure: 134 mmHg      Is BP treated: No      HDL Cholesterol: 62 mg/dL      Total  Cholesterol: 266 mg/dL    Discussed that ASCVD risk borderline in terms of consideration for cholesterol medicine. Pt prefers to work on diet and exercise first for this and pre-diabetes.              Follow Up   Return in about 6 months (around 11/15/2024), or if symptoms worsen or fail to improve, for Recheck, Next scheduled follow up.  Patient was given instructions and counseling regarding her condition or for health maintenance advice. Please see specific information pulled into the AVS if appropriate.

## 2024-05-15 ENCOUNTER — OFFICE VISIT (OUTPATIENT)
Dept: FAMILY MEDICINE CLINIC | Facility: CLINIC | Age: 69
End: 2024-05-15
Payer: MEDICARE

## 2024-05-15 VITALS
DIASTOLIC BLOOD PRESSURE: 84 MMHG | WEIGHT: 135.9 LBS | SYSTOLIC BLOOD PRESSURE: 134 MMHG | OXYGEN SATURATION: 98 % | HEART RATE: 67 BPM | HEIGHT: 65 IN | BODY MASS INDEX: 22.64 KG/M2

## 2024-05-15 DIAGNOSIS — R89.8 EOSINOPHIL COUNT RAISED: ICD-10-CM

## 2024-05-15 DIAGNOSIS — E78.5 HYPERLIPIDEMIA, UNSPECIFIED HYPERLIPIDEMIA TYPE: ICD-10-CM

## 2024-05-15 DIAGNOSIS — E06.3 HASHIMOTO'S THYROIDITIS: ICD-10-CM

## 2024-05-15 DIAGNOSIS — Z12.31 ENCOUNTER FOR SCREENING MAMMOGRAM FOR MALIGNANT NEOPLASM OF BREAST: ICD-10-CM

## 2024-05-15 DIAGNOSIS — Z23 NEED FOR VACCINATION: ICD-10-CM

## 2024-05-15 DIAGNOSIS — E55.9 VITAMIN D INSUFFICIENCY: ICD-10-CM

## 2024-05-15 DIAGNOSIS — J45.909 REACTIVE AIRWAY DISEASE WITHOUT COMPLICATION, UNSPECIFIED ASTHMA SEVERITY, UNSPECIFIED WHETHER PERSISTENT: ICD-10-CM

## 2024-05-15 DIAGNOSIS — Z00.00 ROUTINE HEALTH MAINTENANCE: ICD-10-CM

## 2024-05-15 DIAGNOSIS — M81.6 LOCALIZED OSTEOPOROSIS WITHOUT CURRENT PATHOLOGICAL FRACTURE: ICD-10-CM

## 2024-05-15 DIAGNOSIS — Z00.00 ENCOUNTER FOR SUBSEQUENT ANNUAL WELLNESS VISIT (AWV) IN MEDICARE PATIENT: Primary | ICD-10-CM

## 2024-05-15 PROCEDURE — 90677 PCV20 VACCINE IM: CPT | Performed by: INTERNAL MEDICINE

## 2024-05-15 PROCEDURE — G0009 ADMIN PNEUMOCOCCAL VACCINE: HCPCS | Performed by: INTERNAL MEDICINE

## 2024-05-15 PROCEDURE — 99214 OFFICE O/P EST MOD 30 MIN: CPT | Performed by: INTERNAL MEDICINE

## 2024-05-15 PROCEDURE — 1160F RVW MEDS BY RX/DR IN RCRD: CPT | Performed by: INTERNAL MEDICINE

## 2024-05-15 PROCEDURE — G0439 PPPS, SUBSEQ VISIT: HCPCS | Performed by: INTERNAL MEDICINE

## 2024-05-15 PROCEDURE — 1159F MED LIST DOCD IN RCRD: CPT | Performed by: INTERNAL MEDICINE

## 2024-05-15 RX ORDER — TIOTROPIUM BROMIDE INHALATION SPRAY 1.56 UG/1
SPRAY, METERED RESPIRATORY (INHALATION)
COMMUNITY
Start: 2024-03-01

## 2024-05-15 RX ORDER — MONTELUKAST SODIUM 10 MG/1
TABLET ORAL
COMMUNITY
Start: 2024-04-24

## 2024-05-15 NOTE — ASSESSMENT & PLAN NOTE
Follows with Dr. Diaz  Dx with cough variant asthma, also with high eos  On Breo and Spriva but very expensive  Told pt to call insurance to see if Trelegy or Breztri covered and to check with Dr. Diaz if OK to switch to that if covered  Pt would like eos checked today. Noted that Dr. Diaz considering biologic given hypereosinophilia.

## 2024-05-15 NOTE — ASSESSMENT & PLAN NOTE
The 10-year ASCVD risk score (Eber ZAYAS, et al., 2019) is: 8.2%    Values used to calculate the score:      Age: 68 years      Sex: Female      Is Non- : No      Diabetic: No      Tobacco smoker: No      Systolic Blood Pressure: 134 mmHg      Is BP treated: No      HDL Cholesterol: 62 mg/dL      Total Cholesterol: 266 mg/dL    Discussed that ASCVD risk borderline in terms of consideration for cholesterol medicine. Pt prefers to work on diet and exercise first for this and pre-diabetes.

## 2024-05-15 NOTE — ASSESSMENT & PLAN NOTE
Cologuard - Stool, Per Rectum (11/06/2023 06:30) Negative, repeat 11/2026   Mammogram ordered, due in November  DEXA due 11/2025  Discussed recommended vaccines, [id number] today. Rec RSV, COVID, tdap at local pharmacy  Labs today  No need for cervical CA screening, s/p hyst in her 30s for endometriosis, no abnormal paps  Gave pt information on living will

## 2024-05-15 NOTE — ASSESSMENT & PLAN NOTE
DEXA Bone Density Axial (11/06/2023 07:33)   T scores -3.2  Recommended Fosamax but pt had some GI sxs from that  Patient saw endocrine, Prolia versus Reclast recommended.  Patient still thinking about it.

## 2024-05-16 LAB
25(OH)D3+25(OH)D2 SERPL-MCNC: 26.1 NG/ML (ref 30–100)
BASOPHILS # BLD AUTO: 0.08 10*3/MM3 (ref 0–0.2)
BASOPHILS NFR BLD AUTO: 1.3 % (ref 0–1.5)
EOSINOPHIL # BLD AUTO: 0.98 10*3/MM3 (ref 0–0.4)
EOSINOPHIL NFR BLD AUTO: 15.4 % (ref 0.3–6.2)
ERYTHROCYTE [DISTWIDTH] IN BLOOD BY AUTOMATED COUNT: 12.9 % (ref 12.3–15.4)
HCT VFR BLD AUTO: 42.4 % (ref 34–46.6)
HGB BLD-MCNC: 14.2 G/DL (ref 12–15.9)
IMM GRANULOCYTES # BLD AUTO: 0.02 10*3/MM3 (ref 0–0.05)
IMM GRANULOCYTES NFR BLD AUTO: 0.3 % (ref 0–0.5)
LYMPHOCYTES # BLD AUTO: 1.77 10*3/MM3 (ref 0.7–3.1)
LYMPHOCYTES NFR BLD AUTO: 27.8 % (ref 19.6–45.3)
MCH RBC QN AUTO: 31.3 PG (ref 26.6–33)
MCHC RBC AUTO-ENTMCNC: 33.5 G/DL (ref 31.5–35.7)
MCV RBC AUTO: 93.6 FL (ref 79–97)
MONOCYTES # BLD AUTO: 0.31 10*3/MM3 (ref 0.1–0.9)
MONOCYTES NFR BLD AUTO: 4.9 % (ref 5–12)
NEUTROPHILS # BLD AUTO: 3.2 10*3/MM3 (ref 1.7–7)
NEUTROPHILS NFR BLD AUTO: 50.3 % (ref 42.7–76)
NRBC BLD AUTO-RTO: 0 /100 WBC (ref 0–0.2)
PLATELET # BLD AUTO: 221 10*3/MM3 (ref 140–450)
RBC # BLD AUTO: 4.53 10*6/MM3 (ref 3.77–5.28)
TSH SERPL DL<=0.005 MIU/L-ACNC: 3.27 UIU/ML (ref 0.27–4.2)
WBC # BLD AUTO: 6.36 10*3/MM3 (ref 3.4–10.8)

## 2024-08-30 ENCOUNTER — OFFICE VISIT (OUTPATIENT)
Dept: FAMILY MEDICINE CLINIC | Facility: CLINIC | Age: 69
End: 2024-08-30
Payer: MEDICARE

## 2024-08-30 VITALS
BODY MASS INDEX: 22.81 KG/M2 | OXYGEN SATURATION: 98 % | WEIGHT: 136.9 LBS | HEART RATE: 64 BPM | DIASTOLIC BLOOD PRESSURE: 86 MMHG | HEIGHT: 65 IN | SYSTOLIC BLOOD PRESSURE: 124 MMHG

## 2024-08-30 DIAGNOSIS — E06.3 HASHIMOTO'S THYROIDITIS: ICD-10-CM

## 2024-08-30 DIAGNOSIS — M81.6 LOCALIZED OSTEOPOROSIS WITHOUT CURRENT PATHOLOGICAL FRACTURE: ICD-10-CM

## 2024-08-30 DIAGNOSIS — R25.2 LEG CRAMPS: ICD-10-CM

## 2024-08-30 DIAGNOSIS — R89.8 EOSINOPHIL COUNT RAISED: ICD-10-CM

## 2024-08-30 DIAGNOSIS — R53.83 OTHER FATIGUE: Primary | ICD-10-CM

## 2024-08-30 DIAGNOSIS — E55.9 VITAMIN D INSUFFICIENCY: ICD-10-CM

## 2024-08-30 DIAGNOSIS — R05.3 CHRONIC COUGH: ICD-10-CM

## 2024-08-30 PROCEDURE — 1160F RVW MEDS BY RX/DR IN RCRD: CPT | Performed by: INTERNAL MEDICINE

## 2024-08-30 PROCEDURE — 99214 OFFICE O/P EST MOD 30 MIN: CPT | Performed by: INTERNAL MEDICINE

## 2024-08-30 PROCEDURE — G2211 COMPLEX E/M VISIT ADD ON: HCPCS | Performed by: INTERNAL MEDICINE

## 2024-08-30 PROCEDURE — 1159F MED LIST DOCD IN RCRD: CPT | Performed by: INTERNAL MEDICINE

## 2024-08-30 RX ORDER — CETIRIZINE HYDROCHLORIDE 10 MG/1
10 TABLET ORAL DAILY
COMMUNITY

## 2024-08-30 NOTE — PROGRESS NOTES
"Chief Complaint  Fatigue (Has been really tired. Also has leg cramps every night )    Subjective        HPI   Joan presents to [facility] for a follow up visit.    Answers submitted by the patient for this visit:  Primary Reason for Visit (Submitted on 8/29/2024)  What is the primary reason for your visit?: Shortness of Breath  Shortness of Breath Questionnaire (Submitted on 8/29/2024)  Chief Complaint: Shortness of breath  chest congestion: No  dry cough: Yes    She states she felt bad all summer  Really fatigued  Hot all the time, feels like a furnace inside of her  Breaks out in sweat with exertion, but not drenching sweats, no night sweats  No unintentional weight loss  Leg and feet cramps nightly  Still taking Vitamin D, maybe missed a few doses but getting to end of rx  When she last saw Dr. Diaz, was told to come back in October if things not better  Her cough and WRIGHT has gotten worse this summer  May need biologic  She is on Breo and Spiriva, Zyrtec and Singulair  On Effexor rx'd by Dr. Velazquez   Sometimes has weird dreams, wonders if related to Singulair  Still thinking about osteoporosis medicines, unsure if she wants to take  On Prilosec per Dr. Diaz  No dysphagia, but feels like hoarse all the time  Feels like her medicines are drying her out  Wants to d/w Dr. Diaz re ENT consult        Objective   Vital Signs:  Vitals:    08/30/24 0839   BP: 124/86   BP Location: Left arm   Patient Position: Sitting   Cuff Size: Adult   Pulse: 64   SpO2: 98%   Weight: 62.1 kg (136 lb 14.4 oz)   Height: 165.1 cm (65\")          Physical Exam  Constitutional:       General: She is not in acute distress.     Appearance: Normal appearance.   HENT:      Head: Normocephalic and atraumatic.      Mouth/Throat:      Pharynx: No oropharyngeal exudate or posterior oropharyngeal erythema.   Eyes:      Conjunctiva/sclera: Conjunctivae normal.   Cardiovascular:      Rate and Rhythm: Normal rate and " regular rhythm.   Pulmonary:      Effort: Pulmonary effort is normal. No respiratory distress.      Breath sounds: No wheezing, rhonchi or rales.   Musculoskeletal:         General: No swelling or deformity.      Cervical back: No rigidity or tenderness.      Right lower leg: No edema.      Left lower leg: No edema.   Lymphadenopathy:      Cervical: No cervical adenopathy.   Skin:     Coloration: Skin is not jaundiced.      Findings: No rash.   Neurological:      General: No focal deficit present.      Mental Status: She is alert and oriented to person, place, and time.   Psychiatric:         Mood and Affect: Mood normal.         Behavior: Behavior normal.         Judgment: Judgment normal.          Result Review :                Assessment and Plan    Diagnoses and all orders for this visit:    1. Other fatigue (Primary)  -     Comprehensive metabolic panel  -     Magnesium  -     TSH Rfx On Abnormal To Free T4  -     CBC Auto Differential  -     Vitamin D,25-Hydroxy    2. Hashimoto's thyroiditis  -     TSH Rfx On Abnormal To Free T4    3. Vitamin D insufficiency  -     Vitamin D,25-Hydroxy    4. Leg cramps  -     CK  -     Magnesium    5. Eosinophil count raised  -     CBC Auto Differential    6. Chronic cough    7. Localized osteoporosis without current pathological fracture  -     TSH Rfx On Abnormal To Free T4  -     Vitamin D,25-Hydroxy    Will check comprehensive panel of labs to evaluate her symptoms of fatigue, cramps, and sweats.  She is requesting that I check a CBC to evaluate status of eosinophilia.  Her pulmonary sxs have not improved on essentially maximal medical therapy so I strongly recommended a sooner appointment with Dr. Diaz to discuss treatment options.  She states at one point, a biologic was discussed as a possible option.  Regarding osteoporosis, she is still thinking about her options.  I told her that when she decides, she needs to reach out to Dr. Mosqueda.  Offered her ENT consult given  report of hoarse voice and chronic cough, but she prefers to see Dr. Diaz first to get his thoughts.  She said that she may be getting a bronchoscopy in the future.  I recommended that she establish care with a new PCP sooner rather than later due to ongoing active medical issues.  My last day with [facility] is less than 2 weeks from now.      Follow Up   Return if symptoms worsen or fail to improve.  Patient was given instructions and counseling regarding her condition or for health maintenance advice. Please see specific information pulled into the AVS if appropriate.     I notified the patient that I will be transitioning out of my role as a primary care physician at [facility] effective mid-September. Patient was given a letter with a list of [facility] PCPs who are taking new patients. I recommended that they establish care with one of them to ensure continuity of care.

## 2024-08-31 LAB
25(OH)D3+25(OH)D2 SERPL-MCNC: 41.6 NG/ML (ref 30–100)
ALBUMIN SERPL-MCNC: 4.1 G/DL (ref 3.5–5.2)
ALBUMIN/GLOB SERPL: 1.9 G/DL
ALP SERPL-CCNC: 98 U/L (ref 39–117)
ALT SERPL-CCNC: 18 U/L (ref 1–33)
AST SERPL-CCNC: 19 U/L (ref 1–32)
BASOPHILS # BLD AUTO: 0.07 10*3/MM3 (ref 0–0.2)
BASOPHILS NFR BLD AUTO: 1.2 % (ref 0–1.5)
BILIRUB SERPL-MCNC: 0.2 MG/DL (ref 0–1.2)
BUN SERPL-MCNC: 8 MG/DL (ref 8–23)
BUN/CREAT SERPL: 11.4 (ref 7–25)
CALCIUM SERPL-MCNC: 8.7 MG/DL (ref 8.6–10.5)
CHLORIDE SERPL-SCNC: 102 MMOL/L (ref 98–107)
CK SERPL-CCNC: 124 U/L (ref 20–180)
CO2 SERPL-SCNC: 28.1 MMOL/L (ref 22–29)
CREAT SERPL-MCNC: 0.7 MG/DL (ref 0.57–1)
EGFRCR SERPLBLD CKD-EPI 2021: 94.9 ML/MIN/1.73
EOSINOPHIL # BLD AUTO: 0.9 10*3/MM3 (ref 0–0.4)
EOSINOPHIL NFR BLD AUTO: 14.9 % (ref 0.3–6.2)
ERYTHROCYTE [DISTWIDTH] IN BLOOD BY AUTOMATED COUNT: 13.2 % (ref 12.3–15.4)
GLOBULIN SER CALC-MCNC: 2.2 GM/DL
GLUCOSE SERPL-MCNC: 104 MG/DL (ref 65–99)
HCT VFR BLD AUTO: 42.1 % (ref 34–46.6)
HGB BLD-MCNC: 14.2 G/DL (ref 12–15.9)
IMM GRANULOCYTES # BLD AUTO: 0.02 10*3/MM3 (ref 0–0.05)
IMM GRANULOCYTES NFR BLD AUTO: 0.3 % (ref 0–0.5)
LYMPHOCYTES # BLD AUTO: 1.63 10*3/MM3 (ref 0.7–3.1)
LYMPHOCYTES NFR BLD AUTO: 27 % (ref 19.6–45.3)
MAGNESIUM SERPL-MCNC: 2.2 MG/DL (ref 1.6–2.4)
MCH RBC QN AUTO: 31.4 PG (ref 26.6–33)
MCHC RBC AUTO-ENTMCNC: 33.7 G/DL (ref 31.5–35.7)
MCV RBC AUTO: 93.1 FL (ref 79–97)
MONOCYTES # BLD AUTO: 0.35 10*3/MM3 (ref 0.1–0.9)
MONOCYTES NFR BLD AUTO: 5.8 % (ref 5–12)
NEUTROPHILS # BLD AUTO: 3.07 10*3/MM3 (ref 1.7–7)
NEUTROPHILS NFR BLD AUTO: 50.8 % (ref 42.7–76)
NRBC BLD AUTO-RTO: 0 /100 WBC (ref 0–0.2)
PLATELET # BLD AUTO: 192 10*3/MM3 (ref 140–450)
POTASSIUM SERPL-SCNC: 4.4 MMOL/L (ref 3.5–5.2)
PROT SERPL-MCNC: 6.3 G/DL (ref 6–8.5)
RBC # BLD AUTO: 4.52 10*6/MM3 (ref 3.77–5.28)
SODIUM SERPL-SCNC: 139 MMOL/L (ref 136–145)
TSH SERPL DL<=0.005 MIU/L-ACNC: 2.21 UIU/ML (ref 0.27–4.2)
WBC # BLD AUTO: 6.04 10*3/MM3 (ref 3.4–10.8)

## 2024-09-04 RX ORDER — FLUTICASONE FUROATE AND VILANTEROL TRIFENATATE 200; 25 UG/1; UG/1
1 POWDER RESPIRATORY (INHALATION) DAILY
Qty: 28 EACH | Refills: 5 | Status: SHIPPED | OUTPATIENT
Start: 2024-09-04

## 2024-09-04 NOTE — TELEPHONE ENCOUNTER
Rx Refill Note  Requested Prescriptions     Pending Prescriptions Disp Refills    Breo Ellipta 200-25 MCG/ACT inhaler [Pharmacy Med Name: BREO ELLIPTA 200-25 MCG INHALR] 60 each      Sig: INHALE 1 PUFF BY MOUTH DAILY      Last office visit with prescribing clinician: 8/30/2024   Last telemedicine visit with prescribing clinician: Visit date not found   Next office visit with prescribing clinician: Visit date not found                         Would you like a call back once the refill request has been completed: [] Yes [] No    If the office needs to give you a call back, can they leave a voicemail: [] Yes [] No    Chance Ordonez Rep  09/04/24, 13:23 EDT

## 2024-09-20 ENCOUNTER — HOSPITAL ENCOUNTER (OUTPATIENT)
Facility: HOSPITAL | Age: 69
Discharge: HOME | End: 2024-09-20
Payer: MEDICARE

## 2024-09-20 DIAGNOSIS — R05.3 CHRONIC COUGH: ICD-10-CM

## 2024-09-20 PROCEDURE — 71250 CT THORAX DX C-: CPT

## 2025-03-11 ENCOUNTER — OFFICE VISIT (OUTPATIENT)
Dept: FAMILY MEDICINE CLINIC | Facility: CLINIC | Age: 70
End: 2025-03-11
Payer: MEDICARE

## 2025-03-11 VITALS
HEART RATE: 78 BPM | OXYGEN SATURATION: 99 % | DIASTOLIC BLOOD PRESSURE: 68 MMHG | BODY MASS INDEX: 22.82 KG/M2 | WEIGHT: 137 LBS | TEMPERATURE: 96.8 F | SYSTOLIC BLOOD PRESSURE: 124 MMHG | HEIGHT: 65 IN

## 2025-03-11 DIAGNOSIS — Z13.0 SCREENING FOR DEFICIENCY ANEMIA: ICD-10-CM

## 2025-03-11 DIAGNOSIS — K21.9 GASTROESOPHAGEAL REFLUX DISEASE, UNSPECIFIED WHETHER ESOPHAGITIS PRESENT: ICD-10-CM

## 2025-03-11 DIAGNOSIS — E06.3 HASHIMOTO'S THYROIDITIS: ICD-10-CM

## 2025-03-11 DIAGNOSIS — Z12.31 ENCOUNTER FOR SCREENING MAMMOGRAM FOR MALIGNANT NEOPLASM OF BREAST: ICD-10-CM

## 2025-03-11 DIAGNOSIS — E78.5 HYPERLIPIDEMIA, UNSPECIFIED HYPERLIPIDEMIA TYPE: ICD-10-CM

## 2025-03-11 DIAGNOSIS — M81.6 LOCALIZED OSTEOPOROSIS WITHOUT CURRENT PATHOLOGICAL FRACTURE: ICD-10-CM

## 2025-03-11 DIAGNOSIS — F41.9 ANXIETY: ICD-10-CM

## 2025-03-11 DIAGNOSIS — E55.9 VITAMIN D DEFICIENCY: ICD-10-CM

## 2025-03-11 DIAGNOSIS — J82.83 EOSINOPHILIC ASTHMA: ICD-10-CM

## 2025-03-11 DIAGNOSIS — R73.03 PREDIABETES: Primary | ICD-10-CM

## 2025-03-11 DIAGNOSIS — Z13.6 SCREENING FOR ISCHEMIC HEART DISEASE: ICD-10-CM

## 2025-03-11 PROBLEM — R05.3 CHRONIC COUGH: Status: RESOLVED | Noted: 2024-01-29 | Resolved: 2025-03-11

## 2025-03-11 LAB
25(OH)D3+25(OH)D2 SERPL-MCNC: 34.8 NG/ML (ref 30–100)
ALBUMIN SERPL-MCNC: 4.2 G/DL (ref 3.5–5.2)
ALBUMIN/GLOB SERPL: 1.8 G/DL
ALP SERPL-CCNC: 93 U/L (ref 39–117)
ALT SERPL-CCNC: 19 U/L (ref 1–33)
AST SERPL-CCNC: 20 U/L (ref 1–32)
BASOPHILS # BLD AUTO: 0.01 10*3/MM3 (ref 0–0.2)
BASOPHILS NFR BLD AUTO: 0.2 % (ref 0–1.5)
BILIRUB SERPL-MCNC: 0.3 MG/DL (ref 0–1.2)
BUN SERPL-MCNC: 8 MG/DL (ref 8–23)
BUN/CREAT SERPL: 10.7 (ref 7–25)
CALCIUM SERPL-MCNC: 9.4 MG/DL (ref 8.6–10.5)
CHLORIDE SERPL-SCNC: 104 MMOL/L (ref 98–107)
CHOLEST SERPL-MCNC: 286 MG/DL (ref 0–200)
CO2 SERPL-SCNC: 27.5 MMOL/L (ref 22–29)
CREAT SERPL-MCNC: 0.75 MG/DL (ref 0.57–1)
EGFRCR SERPLBLD CKD-EPI 2021: 86.8 ML/MIN/1.73
EOSINOPHIL # BLD AUTO: 0 10*3/MM3 (ref 0–0.4)
EOSINOPHIL NFR BLD AUTO: 0 % (ref 0.3–6.2)
ERYTHROCYTE [DISTWIDTH] IN BLOOD BY AUTOMATED COUNT: 13 % (ref 12.3–15.4)
GLOBULIN SER CALC-MCNC: 2.3 GM/DL
GLUCOSE SERPL-MCNC: 101 MG/DL (ref 65–99)
HBA1C MFR BLD: 5.5 % (ref 4.8–5.6)
HCT VFR BLD AUTO: 43.8 % (ref 34–46.6)
HDLC SERPL-MCNC: 82 MG/DL (ref 40–60)
HGB BLD-MCNC: 14.5 G/DL (ref 12–15.9)
IMM GRANULOCYTES # BLD AUTO: 0.01 10*3/MM3 (ref 0–0.05)
IMM GRANULOCYTES NFR BLD AUTO: 0.2 % (ref 0–0.5)
LDLC SERPL CALC-MCNC: 183 MG/DL (ref 0–100)
LYMPHOCYTES # BLD AUTO: 1.68 10*3/MM3 (ref 0.7–3.1)
LYMPHOCYTES NFR BLD AUTO: 31.2 % (ref 19.6–45.3)
MCH RBC QN AUTO: 30.5 PG (ref 26.6–33)
MCHC RBC AUTO-ENTMCNC: 33.1 G/DL (ref 31.5–35.7)
MCV RBC AUTO: 92 FL (ref 79–97)
MONOCYTES # BLD AUTO: 0.3 10*3/MM3 (ref 0.1–0.9)
MONOCYTES NFR BLD AUTO: 5.6 % (ref 5–12)
NEUTROPHILS # BLD AUTO: 3.39 10*3/MM3 (ref 1.7–7)
NEUTROPHILS NFR BLD AUTO: 62.8 % (ref 42.7–76)
NRBC BLD AUTO-RTO: 0 /100 WBC (ref 0–0.2)
PLATELET # BLD AUTO: 210 10*3/MM3 (ref 140–450)
POTASSIUM SERPL-SCNC: 4.9 MMOL/L (ref 3.5–5.2)
PROT SERPL-MCNC: 6.5 G/DL (ref 6–8.5)
RBC # BLD AUTO: 4.76 10*6/MM3 (ref 3.77–5.28)
SODIUM SERPL-SCNC: 141 MMOL/L (ref 136–145)
TRIGL SERPL-MCNC: 122 MG/DL (ref 0–150)
TSH SERPL DL<=0.005 MIU/L-ACNC: 2.22 UIU/ML (ref 0.27–4.2)
VLDLC SERPL CALC-MCNC: 21 MG/DL (ref 5–40)
WBC # BLD AUTO: 5.39 10*3/MM3 (ref 3.4–10.8)

## 2025-03-11 RX ORDER — ERGOCALCIFEROL 1.25 MG/1
50000 CAPSULE, LIQUID FILLED ORAL WEEKLY
Qty: 12 CAPSULE | Refills: 1 | Status: SHIPPED | OUTPATIENT
Start: 2025-03-11

## 2025-03-11 RX ORDER — BENRALIZUMAB 30 MG/ML
INJECTION, SOLUTION SUBCUTANEOUS
COMMUNITY
Start: 2025-01-13

## 2025-03-11 NOTE — PROGRESS NOTES
Chief Complaint  Establish Care (Pt is fasting for labs. Pt would like to get referred for a mammogram. Yes to radha.), Osteoporosis, Hashimoto's Thyroiditis, and Fatigue    Subjective        Joan Gonzales presents to [facility]  History of Present Illness       The patient is a 69-year-old female with osteoporosis, Hashimoto's thyroiditis, reactive airway disease, prediabetes, and hyperlipidemia, presenting to [facility]. She is a former patient of Dr. Aggarwal.    She has been diagnosed with osteoporosis and has been under the care of Dr. Mosqueda, who recommended Reclast therapy. She has previously experienced intolerance to Fosamax due to GERD. She has no upcoming dental procedures or joint replacements planned. She has expressed interest in understanding the potential side effects of Reclast.     She has a history of Hashimoto's thyroiditis, with normal thyroid levels but elevated antibodies. She reports experiencing fatigue. She is not currently on levothyroxine due to normal TSH.    She has been informed that her cholesterol levels are elevated. She has expressed concern about her blood sugar levels, which are borderline prediabetic.    She has been on a high-dose vitamin D supplement but ran out of it when her doctor left the practice in September. She consulted Dr. Sheehan, a psychiatrist she sees for anxiety, who provided her with a month's supply of the supplement.    She has been on venlafaxine for several years for anxiety, which she reports as being effective.    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 She reports that her cough resolved upon initiation of the therapy but has noticed a slight increase in coughing and shortness of breath as the dose tapers off. She experiences shortness of breath during activities such as cleaning, dusting, and yard work, which  "she attributes to being out of shape. She has an albuterol inhaler but has not needed to use it since the initial severe episode. She was initially told that she did not have asthma due to normal PFTs but has since been diagnosed with the condition. She has requested a CBC to check her eosinophil levels, which have remained high but has not been checked since on Fasenra. She has a follow-up appointment with Dr. Diaz in 6 months, who plans to attempt weaning her off the inhalers. She has experienced bizarre dreams with Singulair. She is on Breo and Spiriva. She also takes Zyrtec. S    She has expressed a need for a mammogram and has requested an order for the same. She underwent Cologuard testing in 2024.       Objective   Vital Signs:  /68   Pulse 78   Temp 96.8 °F (36 °C)   Ht 165.1 cm (65\")   Wt 62.1 kg (137 lb)   SpO2 99%   BMI 22.80 kg/m²   Estimated body mass index is 22.8 kg/m² as calculated from the following:    Height as of this encounter: 165.1 cm (65\").    Weight as of this encounter: 62.1 kg (137 lb).    BMI is within normal parameters. No other follow-up for BMI required.      Physical Exam  Constitutional:       General: She is not in acute distress.  Eyes:      Conjunctiva/sclera: Conjunctivae normal.   Cardiovascular:      Rate and Rhythm: Normal rate and regular rhythm.   Pulmonary:      Effort: Pulmonary effort is normal. No respiratory distress.   Abdominal:      Palpations: Abdomen is soft.      Tenderness: There is no abdominal tenderness.   Skin:     Findings: No rash.   Neurological:      Mental Status: She is alert and oriented to person, place, and time.   Psychiatric:         Mood and Affect: Mood normal.         Behavior: Behavior normal.        Result Review :  The following data was reviewed by: Jennifer Salcido MD on 03/11/2025:  Common labs          5/15/2024    09:18 8/30/2024    09:34   Common Labs   Glucose  104    BUN  8    Creatinine  0.70    Sodium  139    Potassium  " 4.4    Chloride  102    Calcium  8.7    Albumin  4.1    Total Bilirubin  0.2    Alkaline Phosphatase  98    AST (SGOT)  19    ALT (SGPT)  18    WBC 6.36  6.04    Hemoglobin 14.2  14.2    Hematocrit 42.4  42.1    Platelets 221  192      Data reviewed : see hPI           Assessment and Plan   There are no diagnoses linked to this encounter.       1. Osteoporosis.  She has been advised to commence Reclast therapy under the supervision of Dr. Mosqueda. She will make appt for this. Discussed bone density testing every 2 yrs.    2. Hashimoto's thyroiditis.  Her TSH levels were last checked in August 2024. She has been informed that treatment for Hashimoto's thyroiditis will only be initiated if her TSH levels become elevated. She has been advised to continue monitoring her TSH levels. A TSH test will be conducted today.    3. Prediabetes.  Her blood sugar levels are mildly elevated. She has been advised to limit her intake of sugar and carbohydrates due to her increased risk of developing diabetes.    4. Hyperlipidemia.  A lipid panel will be conducted today to assess her cholesterol levels.    5. Anxiety.  She is currently on venlafaxine for anxiety, which she reports is working well. She has been advised to continue her current medication regimen.    6. Eosinophilic asthma.  She has been informed that her asthma appears to be more severe than the average case. She has been advised to use her albuterol inhaler prior to exposure to dust, mold, leaves, and pollen to help manage her symptoms. Continue to follow with Dr. Diaz. A CBC will be conducted today to assess her eosinophil count.    7. Gastroesophageal reflux disease (GERD).  She has been advised to continue taking omeprazole daily for her reflux.    8. Health maintenance.  A mammogram has been ordered for her today. She has been informed that her next colonoscopy/cologuard is due in 2027. She has been advised to schedule her Medicare wellness visit in May 2025.    10.  Vitamin D deficiency.  A vitamin D level test will be conducted today. She has been advised to continue her high-dose vitamin D supplement.    Follow-up  The patient will follow up in 6 months.    PROCEDURE  The patient underwent a sinus lift procedure following a failed dental implant several years ago.            Follow Up   No follow-ups on file.  Patient was given instructions and counseling regarding her condition or for health maintenance advice. Please see specific information pulled into the AVS if appropriate.     Patient or patient representative verbalized consent for the use of Ambient Listening during the visit with  Jennifer Salcido MD for chart documentation. 3/11/2025  09:54 EDT

## 2025-03-13 ENCOUNTER — RESULTS FOLLOW-UP (OUTPATIENT)
Dept: FAMILY MEDICINE CLINIC | Facility: CLINIC | Age: 70
End: 2025-03-13
Payer: MEDICARE

## 2025-03-13 PROBLEM — J82.83 EOSINOPHILIC ASTHMA: Status: ACTIVE | Noted: 2024-05-15

## 2025-03-13 PROBLEM — K21.9 GASTROESOPHAGEAL REFLUX DISEASE: Status: ACTIVE | Noted: 2025-03-13

## 2025-04-15 ENCOUNTER — HOSPITAL ENCOUNTER (OUTPATIENT)
Dept: MAMMOGRAPHY | Facility: HOSPITAL | Age: 70
Discharge: HOME | End: 2025-04-15
Admitting: STUDENT IN AN ORGANIZED HEALTH CARE EDUCATION/TRAINING PROGRAM
Payer: MEDICARE

## 2025-04-15 DIAGNOSIS — Z12.31 ENCOUNTER FOR SCREENING MAMMOGRAM FOR MALIGNANT NEOPLASM OF BREAST: ICD-10-CM

## 2025-04-15 PROCEDURE — 77067 SCR MAMMO BI INCL CAD: CPT

## 2025-04-15 PROCEDURE — 77063 BREAST TOMOSYNTHESIS BI: CPT
